# Patient Record
Sex: FEMALE | Race: WHITE | NOT HISPANIC OR LATINO | Employment: STUDENT | URBAN - METROPOLITAN AREA
[De-identification: names, ages, dates, MRNs, and addresses within clinical notes are randomized per-mention and may not be internally consistent; named-entity substitution may affect disease eponyms.]

---

## 2024-02-22 ENCOUNTER — TELEPHONE (OUTPATIENT)
Dept: OBSTETRICS AND GYNECOLOGY | Facility: CLINIC | Age: 29
End: 2024-02-22
Payer: COMMERCIAL

## 2024-02-22 NOTE — TELEPHONE ENCOUNTER
Spoke with pt, appt scheduled.     ----- Message from Sophia Horne sent at 2/22/2024 11:54 AM CST -----  Contact: Yuliya  Pt is calling in regards to est care/ confirm pregnancy. Pt can be reached at [phone].          Thanks  CHINO

## 2024-02-28 ENCOUNTER — OFFICE VISIT (OUTPATIENT)
Dept: OBSTETRICS AND GYNECOLOGY | Facility: CLINIC | Age: 29
End: 2024-02-28
Payer: COMMERCIAL

## 2024-02-28 ENCOUNTER — LAB VISIT (OUTPATIENT)
Dept: LAB | Facility: HOSPITAL | Age: 29
End: 2024-02-28
Attending: STUDENT IN AN ORGANIZED HEALTH CARE EDUCATION/TRAINING PROGRAM
Payer: COMMERCIAL

## 2024-02-28 VITALS
WEIGHT: 133.81 LBS | SYSTOLIC BLOOD PRESSURE: 108 MMHG | DIASTOLIC BLOOD PRESSURE: 60 MMHG | BODY MASS INDEX: 20.96 KG/M2

## 2024-02-28 DIAGNOSIS — O20.0 THREATENED MISCARRIAGE IN EARLY PREGNANCY: Primary | ICD-10-CM

## 2024-02-28 DIAGNOSIS — O20.0 THREATENED MISCARRIAGE IN EARLY PREGNANCY: ICD-10-CM

## 2024-02-28 LAB — HCG INTACT+B SERPL-ACNC: 7.5 MIU/ML

## 2024-02-28 PROCEDURE — 99204 OFFICE O/P NEW MOD 45 MIN: CPT | Mod: S$GLB,,,

## 2024-02-28 PROCEDURE — 1159F MED LIST DOCD IN RCRD: CPT | Mod: CPTII,S$GLB,,

## 2024-02-28 PROCEDURE — 36415 COLL VENOUS BLD VENIPUNCTURE: CPT

## 2024-02-28 PROCEDURE — 84702 CHORIONIC GONADOTROPIN TEST: CPT

## 2024-02-28 PROCEDURE — 3074F SYST BP LT 130 MM HG: CPT | Mod: CPTII,S$GLB,,

## 2024-02-28 PROCEDURE — 3008F BODY MASS INDEX DOCD: CPT | Mod: CPTII,S$GLB,,

## 2024-02-28 PROCEDURE — 99999 PR PBB SHADOW E&M-EST. PATIENT-LVL III: CPT | Mod: PBBFAC,,,

## 2024-02-28 PROCEDURE — 3078F DIAST BP <80 MM HG: CPT | Mod: CPTII,S$GLB,,

## 2024-02-28 RX ORDER — SERTRALINE HYDROCHLORIDE 50 MG/1
50 TABLET, FILM COATED ORAL DAILY
COMMUNITY

## 2024-02-28 NOTE — PROGRESS NOTES
Subjective:      Yuliya Flores is a 29 y.o. female. Yuliya reports bleeding and passage of clots since yesterday 24. She is not in acute distress. Ectopic risks: none.    Cycle length: has not had a cycle since before last pregnancy. Is still currently breastfeeding  Pregnancy testing: at home.  Pregnancy imaging:  V-scan utilized. GS noted with NO FP or YS .  Blood type: B positive..    The following portions of the patient's history were reviewed and updated as appropriate: allergies, current medications, past family history, past medical history, past social history, past surgical history, and problem list.    Review of Systems  Pertinent items are noted in HPI.     Objective:       /60   Wt 60.7 kg (133 lb 13.1 oz)   BMI 20.96 kg/m²   General:   alert, appears stated age, and cooperative                                         Imaging  Limited office ultrasound: not done on today's visit      Assessment:          Threatened      Plan:      Quantitative hCG today and again in 48 hours.  Warning signs discussed: to call for increased bleeding, abdominal or shoulder pain, light headedness, or if she has any concerns.

## 2024-03-01 ENCOUNTER — LAB VISIT (OUTPATIENT)
Dept: LAB | Facility: HOSPITAL | Age: 29
End: 2024-03-01
Payer: COMMERCIAL

## 2024-03-01 DIAGNOSIS — O20.0 THREATENED MISCARRIAGE IN EARLY PREGNANCY: ICD-10-CM

## 2024-03-01 LAB — HCG INTACT+B SERPL-ACNC: 2.7 MIU/ML

## 2024-03-01 PROCEDURE — 84702 CHORIONIC GONADOTROPIN TEST: CPT

## 2024-03-01 PROCEDURE — 36415 COLL VENOUS BLD VENIPUNCTURE: CPT

## 2024-07-09 ENCOUNTER — PATIENT MESSAGE (OUTPATIENT)
Dept: RESEARCH | Facility: HOSPITAL | Age: 29
End: 2024-07-09
Payer: COMMERCIAL

## 2024-08-08 ENCOUNTER — PATIENT MESSAGE (OUTPATIENT)
Dept: OBSTETRICS AND GYNECOLOGY | Facility: CLINIC | Age: 29
End: 2024-08-08
Payer: COMMERCIAL

## 2024-08-14 ENCOUNTER — LAB VISIT (OUTPATIENT)
Dept: LAB | Facility: HOSPITAL | Age: 29
End: 2024-08-14
Attending: ADVANCED PRACTICE MIDWIFE
Payer: COMMERCIAL

## 2024-08-14 ENCOUNTER — OFFICE VISIT (OUTPATIENT)
Dept: OBSTETRICS AND GYNECOLOGY | Facility: CLINIC | Age: 29
End: 2024-08-14
Payer: COMMERCIAL

## 2024-08-14 ENCOUNTER — PROCEDURE VISIT (OUTPATIENT)
Dept: OBSTETRICS AND GYNECOLOGY | Facility: CLINIC | Age: 29
End: 2024-08-14
Payer: COMMERCIAL

## 2024-08-14 VITALS — DIASTOLIC BLOOD PRESSURE: 64 MMHG | BODY MASS INDEX: 20.75 KG/M2 | WEIGHT: 132.5 LBS | SYSTOLIC BLOOD PRESSURE: 122 MMHG

## 2024-08-14 DIAGNOSIS — Z32.01 POSITIVE PREGNANCY TEST: Primary | ICD-10-CM

## 2024-08-14 DIAGNOSIS — Z32.01 POSITIVE PREGNANCY TEST: ICD-10-CM

## 2024-08-14 DIAGNOSIS — O99.341 ANXIETY IN PREGNANCY, ANTEPARTUM, FIRST TRIMESTER: ICD-10-CM

## 2024-08-14 DIAGNOSIS — F41.9 ANXIETY IN PREGNANCY, ANTEPARTUM, FIRST TRIMESTER: ICD-10-CM

## 2024-08-14 PROBLEM — F41.1 GAD (GENERALIZED ANXIETY DISORDER): Status: ACTIVE | Noted: 2024-01-22

## 2024-08-14 LAB
ABO + RH BLD: NORMAL
BASOPHILS # BLD AUTO: 0.03 K/UL (ref 0–0.2)
BASOPHILS NFR BLD: 0.6 % (ref 0–1.9)
BLD GP AB SCN CELLS X3 SERPL QL: NORMAL
DIFFERENTIAL METHOD BLD: NORMAL
EOSINOPHIL # BLD AUTO: 0 K/UL (ref 0–0.5)
EOSINOPHIL NFR BLD: 0.6 % (ref 0–8)
ERYTHROCYTE [DISTWIDTH] IN BLOOD BY AUTOMATED COUNT: 12.1 % (ref 11.5–14.5)
HAV IGM SERPL QL IA: NORMAL
HBV CORE IGM SERPL QL IA: NORMAL
HBV SURFACE AG SERPL QL IA: NORMAL
HCG INTACT+B SERPL-ACNC: 2312 MIU/ML
HCT VFR BLD AUTO: 37.6 % (ref 37–48.5)
HCV AB SERPL QL IA: NORMAL
HGB BLD-MCNC: 12.7 G/DL (ref 12–16)
HIV 1+2 AB+HIV1 P24 AG SERPL QL IA: NORMAL
IMM GRANULOCYTES # BLD AUTO: 0.01 K/UL (ref 0–0.04)
IMM GRANULOCYTES NFR BLD AUTO: 0.2 % (ref 0–0.5)
LYMPHOCYTES # BLD AUTO: 1.7 K/UL (ref 1–4.8)
LYMPHOCYTES NFR BLD: 34.6 % (ref 18–48)
MCH RBC QN AUTO: 30.3 PG (ref 27–31)
MCHC RBC AUTO-ENTMCNC: 33.8 G/DL (ref 32–36)
MCV RBC AUTO: 90 FL (ref 82–98)
MONOCYTES # BLD AUTO: 0.4 K/UL (ref 0.3–1)
MONOCYTES NFR BLD: 7.2 % (ref 4–15)
NEUTROPHILS # BLD AUTO: 2.8 K/UL (ref 1.8–7.7)
NEUTROPHILS NFR BLD: 56.8 % (ref 38–73)
NRBC BLD-RTO: 0 /100 WBC
PLATELET # BLD AUTO: 327 K/UL (ref 150–450)
PMV BLD AUTO: 10.5 FL (ref 9.2–12.9)
RBC # BLD AUTO: 4.19 M/UL (ref 4–5.4)
SPECIMEN OUTDATE: NORMAL
TREPONEMA PALLIDUM IGG+IGM AB [PRESENCE] IN SERUM OR PLASMA BY IMMUNOASSAY: NONREACTIVE
WBC # BLD AUTO: 4.85 K/UL (ref 3.9–12.7)

## 2024-08-14 PROCEDURE — 87147 CULTURE TYPE IMMUNOLOGIC: CPT | Performed by: ADVANCED PRACTICE MIDWIFE

## 2024-08-14 PROCEDURE — 86900 BLOOD TYPING SEROLOGIC ABO: CPT | Performed by: ADVANCED PRACTICE MIDWIFE

## 2024-08-14 PROCEDURE — 86762 RUBELLA ANTIBODY: CPT | Performed by: ADVANCED PRACTICE MIDWIFE

## 2024-08-14 PROCEDURE — 87088 URINE BACTERIA CULTURE: CPT | Performed by: ADVANCED PRACTICE MIDWIFE

## 2024-08-14 PROCEDURE — 99999 PR PBB SHADOW E&M-EST. PATIENT-LVL III: CPT | Mod: PBBFAC,,, | Performed by: ADVANCED PRACTICE MIDWIFE

## 2024-08-14 PROCEDURE — 86593 SYPHILIS TEST NON-TREP QUANT: CPT | Performed by: ADVANCED PRACTICE MIDWIFE

## 2024-08-14 PROCEDURE — 83021 HEMOGLOBIN CHROMOTOGRAPHY: CPT | Performed by: ADVANCED PRACTICE MIDWIFE

## 2024-08-14 PROCEDURE — 84702 CHORIONIC GONADOTROPIN TEST: CPT | Performed by: ADVANCED PRACTICE MIDWIFE

## 2024-08-14 PROCEDURE — 36415 COLL VENOUS BLD VENIPUNCTURE: CPT | Performed by: ADVANCED PRACTICE MIDWIFE

## 2024-08-14 PROCEDURE — 87086 URINE CULTURE/COLONY COUNT: CPT | Performed by: ADVANCED PRACTICE MIDWIFE

## 2024-08-14 PROCEDURE — 87591 N.GONORRHOEAE DNA AMP PROB: CPT | Performed by: ADVANCED PRACTICE MIDWIFE

## 2024-08-14 PROCEDURE — 87389 HIV-1 AG W/HIV-1&-2 AB AG IA: CPT | Performed by: ADVANCED PRACTICE MIDWIFE

## 2024-08-14 PROCEDURE — 85025 COMPLETE CBC W/AUTO DIFF WBC: CPT | Performed by: ADVANCED PRACTICE MIDWIFE

## 2024-08-14 PROCEDURE — 87491 CHLMYD TRACH DNA AMP PROBE: CPT | Performed by: ADVANCED PRACTICE MIDWIFE

## 2024-08-14 PROCEDURE — 86850 RBC ANTIBODY SCREEN: CPT | Performed by: ADVANCED PRACTICE MIDWIFE

## 2024-08-14 PROCEDURE — 80074 ACUTE HEPATITIS PANEL: CPT | Performed by: ADVANCED PRACTICE MIDWIFE

## 2024-08-14 RX ORDER — ASPIRIN 81 MG/1
81 TABLET ORAL DAILY
COMMUNITY

## 2024-08-14 RX ORDER — SERTRALINE HYDROCHLORIDE 50 MG/1
1 TABLET, FILM COATED ORAL EVERY MORNING
COMMUNITY
Start: 2023-09-27

## 2024-08-14 NOTE — PROGRESS NOTES
CHIEF COMPLAINT:   Patient presents with      Possible Pregnancy        HISTORY OF PRESENT ILLNESS  Yuliya Flores 29 y.o.  presents for pregnancy risk assessment.   The patient has no complaints today.  No nausea or vomiting. No bleeding or pain.  Pregnancy was planned and is desired.  Partner is supportive of pregnancy.  Lives at home with  and 3y/o son.  Denies domestic abuse.  Denies chemical/pesticide/radiation exposure.  OB history:      LMP: Patient's last menstrual period was 06/15/2024 (exact date).  EDC: Estimated Date of Delivery: 3/22/25  EGA: 8w4d       Health Maintenance   Topic Date Due    Hepatitis C Screening  Never done    Lipid Panel  Never done    Pap Smear  Never done    TETANUS VACCINE  2032       History reviewed. No pertinent past medical history.    History reviewed. No pertinent surgical history.    No family history on file.    Social History     Socioeconomic History    Marital status:    Tobacco Use    Smoking status: Never    Smokeless tobacco: Never   Substance and Sexual Activity    Alcohol use: No    Drug use: No    Sexual activity: Never       Current Outpatient Medications   Medication Sig Dispense Refill    sertraline (ZOLOFT) 50 MG tablet Take 1 tablet by mouth every morning.      aspirin (ECOTRIN) 81 MG EC tablet Take 81 mg by mouth once daily.      prenatal vit no.124/iron/folic (PRENATAL VITAMIN ORAL) Take 1 tablet by mouth once daily.       No current facility-administered medications for this visit.       Review of patient's allergies indicates:  No Known Allergies      PHYSICAL EXAM   Vitals:    24 1027   BP: 122/64        PAIN SCALE: 0/10 None    PHYSICAL EXAM    ROS:  GENERAL: No fever, chills, fatigability or weight loss.  CV: Denies chest pain  PULM: Denies shortness of breath or wheezing.  ABDOMEN: Appetite fine. No weight loss. Denies diarrhea, abdominal pain, hematemesis or blood in stool.  URINARY: No flank pain, dysuria or  hematuria.  REPRODUCTIVE: No abnormal vaginal bleeding.       PE:   APPEARANCE: Well nourished, well developed, in no acute distress  CHEST: Clear to auscultation bilaterally  CV: Regular rate and rhythm  ABDOMEN: Soft. No tenderness or masses. No hepatosplenomegaly. No hernias  PELVIC:   VULVA: No lesions. Normal female genitalia.  URETHRAL MEATUS: Normal size and location, no lesions, no prolapse.  URETHRA: No masses, tenderness, prolapse or scarring.  VAGINA: Moist and well rugated, no discharge, no significant cystocele or rectocele.  CERVIX: No lesions, normal diameter, no stenosis, no cervical motion tenderness.   Appears 4 weeks today    UPT +    A/P:     -      Patient was counseled today on A.C.S. Pap guidelines and recommendations for yearly pelvic exams, mammograms and monthly self breast exams; to see her PCP for other health maintenance and pregnancy.   -      Patient's medications and medical history reviewed with patient and implications in pregnancy.   -      Pregnancy course discussed and 'AtoZ' book given. Patient was counseled on proper weight gain based on the [employer]'s recommendations based on her pre-pregnancy weight. Discussed foods to avoid in pregnancy (i.e. sushi, fish that are high in mercury, deli meat, and unpasteurized cheeses). Discussed prenatal vitamin options (i.e. stool softener, DHA). Discussed potential medical problems in pregnancy.  -     Discussed risk of Toxoplasmosis transmission from pets and reviewed risk reduction techniques.  -     Pt was counseled on exercise in pregnancy and weight gain recommendations.  -     Oriented to practice including CNM collaboration.   -     Follow-up initial OB with u/s.   -     Pap smear and genprobe collected today  -     Labs today  -     Discussed last delivery, found out her father had CA during pregnancy and he passed away 3 weeks prior to her delivery, her blood pressure was slightly elevated towards the end of pregnancy  d/t this. She labored at the birth center and had prolonged second stage, was transferred to [facility] with Dr. Godinez and received an epidural and was told she had some protein in her urine, she was started on Mag therapy but was discontinued because her blood pressures were too low. Advised ASA 81mg daily   On zoloft 50mg for anxiety and stable at this time

## 2024-08-15 LAB
C TRACH DNA SPEC QL NAA+PROBE: NOT DETECTED
HGB A2 MFR BLD HPLC: 2.9 % (ref 2.2–3.2)
HGB FRACT BLD ELPH-IMP: NORMAL
HGB FRACT BLD ELPH-IMP: NORMAL
N GONORRHOEA DNA SPEC QL NAA+PROBE: NOT DETECTED
RUBV IGG SER-ACNC: 12.5 IU/ML
RUBV IGG SER-IMP: REACTIVE

## 2024-08-16 DIAGNOSIS — R82.71 GBS BACTERIURIA: Primary | ICD-10-CM

## 2024-08-16 LAB — BACTERIA UR CULT: ABNORMAL

## 2024-08-16 RX ORDER — AMOXICILLIN 500 MG/1
500 TABLET, FILM COATED ORAL EVERY 12 HOURS
Qty: 20 TABLET | Refills: 0 | Status: SHIPPED | OUTPATIENT
Start: 2024-08-16 | End: 2024-08-26

## 2024-08-17 ENCOUNTER — PATIENT MESSAGE (OUTPATIENT)
Dept: OBSTETRICS AND GYNECOLOGY | Facility: CLINIC | Age: 29
End: 2024-08-17
Payer: COMMERCIAL

## 2024-08-22 ENCOUNTER — PATIENT MESSAGE (OUTPATIENT)
Dept: OBSTETRICS AND GYNECOLOGY | Facility: CLINIC | Age: 29
End: 2024-08-22
Payer: COMMERCIAL

## 2024-08-30 ENCOUNTER — TELEPHONE (OUTPATIENT)
Dept: OBSTETRICS AND GYNECOLOGY | Facility: CLINIC | Age: 29
End: 2024-08-30
Payer: COMMERCIAL

## 2024-08-30 NOTE — TELEPHONE ENCOUNTER
Contacted patient, verified 2 patient identifiers, informed pt of results, and provider request for colpo. Patient accepted first available with Adriana Fierro MD at the [place] location. Patient declined consult appointment.

## 2024-08-30 NOTE — TELEPHONE ENCOUNTER
----- Message from Anil Andre CNM sent at 8/30/2024  7:37 AM CDT -----  Please call pt and let her know ASCUS with HPV +. She will need to get an appointment for a colposcopy. Thank you.  ----- Message -----  From: [facility]  Sent: 8/15/2024   9:22 PM CDT  To: Anil Andre CNM

## 2024-09-07 ENCOUNTER — PATIENT MESSAGE (OUTPATIENT)
Dept: OBSTETRICS AND GYNECOLOGY | Facility: CLINIC | Age: 29
End: 2024-09-07
Payer: COMMERCIAL

## 2024-09-10 ENCOUNTER — TELEPHONE (OUTPATIENT)
Dept: OBSTETRICS AND GYNECOLOGY | Facility: CLINIC | Age: 29
End: 2024-09-10
Payer: COMMERCIAL

## 2024-09-10 RX ORDER — DOXYLAMINE SUCCINATE AND PYRIDOXINE HYDROCHLORIDE 20; 20 MG/1; MG/1
1 TABLET, EXTENDED RELEASE ORAL 2 TIMES DAILY PRN
Qty: 60 TABLET | Refills: 1 | Status: SHIPPED | OUTPATIENT
Start: 2024-09-10

## 2024-09-10 NOTE — TELEPHONE ENCOUNTER
Pt called and wanted to reschedule her appointment on Thursday the 12th due to bad weather. Rescheduled pt to 9/19/24. Pt agreed to new appointment.

## 2024-09-19 ENCOUNTER — PATIENT MESSAGE (OUTPATIENT)
Dept: OBSTETRICS AND GYNECOLOGY | Facility: CLINIC | Age: 29
End: 2024-09-19
Payer: COMMERCIAL

## 2024-09-25 ENCOUNTER — PROCEDURE VISIT (OUTPATIENT)
Dept: OBSTETRICS AND GYNECOLOGY | Facility: CLINIC | Age: 29
End: 2024-09-25
Payer: COMMERCIAL

## 2024-09-25 ENCOUNTER — PATIENT MESSAGE (OUTPATIENT)
Dept: ADMINISTRATIVE | Facility: OTHER | Age: 29
End: 2024-09-25
Payer: COMMERCIAL

## 2024-09-25 ENCOUNTER — PATIENT MESSAGE (OUTPATIENT)
Dept: OBSTETRICS AND GYNECOLOGY | Facility: CLINIC | Age: 29
End: 2024-09-25

## 2024-09-25 ENCOUNTER — INITIAL PRENATAL (OUTPATIENT)
Dept: OBSTETRICS AND GYNECOLOGY | Facility: CLINIC | Age: 29
End: 2024-09-25
Payer: COMMERCIAL

## 2024-09-25 VITALS
WEIGHT: 131.63 LBS | SYSTOLIC BLOOD PRESSURE: 120 MMHG | DIASTOLIC BLOOD PRESSURE: 76 MMHG | BODY MASS INDEX: 20.61 KG/M2

## 2024-09-25 DIAGNOSIS — F41.9 ANXIETY IN PREGNANCY, ANTEPARTUM, FIRST TRIMESTER: ICD-10-CM

## 2024-09-25 DIAGNOSIS — O99.341 ANXIETY IN PREGNANCY, ANTEPARTUM, FIRST TRIMESTER: ICD-10-CM

## 2024-09-25 DIAGNOSIS — Z32.01 POSITIVE PREGNANCY TEST: ICD-10-CM

## 2024-09-25 DIAGNOSIS — R87.810 ASCUS WITH POSITIVE HIGH RISK HPV CERVICAL: ICD-10-CM

## 2024-09-25 DIAGNOSIS — Z3A.11 11 WEEKS GESTATION OF PREGNANCY: Primary | ICD-10-CM

## 2024-09-25 DIAGNOSIS — R87.610 ASCUS WITH POSITIVE HIGH RISK HPV CERVICAL: ICD-10-CM

## 2024-09-25 DIAGNOSIS — R82.71 GBS BACTERIURIA: ICD-10-CM

## 2024-09-25 PROCEDURE — 99999 PR PBB SHADOW E&M-EST. PATIENT-LVL III: CPT | Mod: PBBFAC,,, | Performed by: MIDWIFE

## 2024-09-25 PROCEDURE — 87088 URINE BACTERIA CULTURE: CPT | Performed by: MIDWIFE

## 2024-09-25 PROCEDURE — 0502F SUBSEQUENT PRENATAL CARE: CPT | Mod: CPTII,S$GLB,, | Performed by: MIDWIFE

## 2024-09-25 PROCEDURE — 87147 CULTURE TYPE IMMUNOLOGIC: CPT | Performed by: MIDWIFE

## 2024-09-25 PROCEDURE — 76801 OB US < 14 WKS SINGLE FETUS: CPT | Mod: S$GLB,,, | Performed by: OBSTETRICS & GYNECOLOGY

## 2024-09-25 PROCEDURE — 87086 URINE CULTURE/COLONY COUNT: CPT | Performed by: MIDWIFE

## 2024-09-25 NOTE — PROGRESS NOTES
11 weeks gestation of pregnancy  -     Connected MOM Enrollment  -     Assign Connected MOM Program Consent Questionnaire    Patient here for Initial OB visit and dating scan  U/S: single viable IUP. NELSON based on CRL: 4/13/25  Denies pain or vaginal bleeding. Nausea is manageable.   Reviewed Risk Assessment note and New OB labs.  Discussed genetic screening in pregnancy. Mat21 info given, encouraged to check coverage with insurance company if desires.  Offered flu shot, declines   Taking daily baby ASA   Connected MOM order placed and instructions given  A to Z book given. Encouraged to reference as needed.   Bleeding and pain precautions reviewed  RTC in 4 weeks, sooner if needed     GBS bacteriuria  -     CULTURE, URINE    JE today  Discussed treatment in labor    Anxiety in pregnancy, antepartum, first trimester    Zoloft    ASCUS with positive high risk HPV cervical     Colpo PP per Dr. Fierro -- questions answered

## 2024-09-27 LAB — BACTERIA UR CULT: ABNORMAL

## 2024-10-04 ENCOUNTER — PATIENT MESSAGE (OUTPATIENT)
Dept: OBSTETRICS AND GYNECOLOGY | Facility: CLINIC | Age: 29
End: 2024-10-04
Payer: COMMERCIAL

## 2024-10-22 ENCOUNTER — ROUTINE PRENATAL (OUTPATIENT)
Dept: OBSTETRICS AND GYNECOLOGY | Facility: CLINIC | Age: 29
End: 2024-10-22
Payer: COMMERCIAL

## 2024-10-22 VITALS — BODY MASS INDEX: 21.3 KG/M2 | WEIGHT: 136 LBS | DIASTOLIC BLOOD PRESSURE: 70 MMHG | SYSTOLIC BLOOD PRESSURE: 119 MMHG

## 2024-10-22 DIAGNOSIS — R11.0 NAUSEA: ICD-10-CM

## 2024-10-22 DIAGNOSIS — Z3A.15 15 WEEKS GESTATION OF PREGNANCY: Primary | ICD-10-CM

## 2024-10-22 DIAGNOSIS — O99.342 ANXIETY DURING PREGNANCY IN SECOND TRIMESTER, ANTEPARTUM: ICD-10-CM

## 2024-10-22 DIAGNOSIS — F41.9 ANXIETY DURING PREGNANCY IN SECOND TRIMESTER, ANTEPARTUM: ICD-10-CM

## 2024-10-22 DIAGNOSIS — R87.810 ASCUS WITH POSITIVE HIGH RISK HPV CERVICAL: ICD-10-CM

## 2024-10-22 DIAGNOSIS — R82.71 GBS BACTERIURIA: ICD-10-CM

## 2024-10-22 DIAGNOSIS — R87.610 ASCUS WITH POSITIVE HIGH RISK HPV CERVICAL: ICD-10-CM

## 2024-10-22 DIAGNOSIS — Z36.89 ENCOUNTER FOR FETAL ANATOMIC SURVEY: ICD-10-CM

## 2024-10-22 PROBLEM — Z34.92 NORMAL PREGNANCY IN SECOND TRIMESTER: Status: ACTIVE | Noted: 2024-10-22

## 2024-10-22 PROCEDURE — 0502F SUBSEQUENT PRENATAL CARE: CPT | Mod: CPTII,S$GLB,, | Performed by: MIDWIFE

## 2024-10-22 PROCEDURE — 99999 PR PBB SHADOW E&M-EST. PATIENT-LVL III: CPT | Mod: PBBFAC,,, | Performed by: MIDWIFE

## 2024-10-22 RX ORDER — ONDANSETRON 4 MG/1
4 TABLET, ORALLY DISINTEGRATING ORAL EVERY 6 HOURS PRN
Qty: 25 TABLET | Refills: 1 | Status: SHIPPED | OUTPATIENT
Start: 2024-10-22

## 2024-10-22 NOTE — PROGRESS NOTES
15 weeks gestation of pregnancy    Overall doing well, starting to feel better and get energy back, going to gym  + flutters  Denies pain, VB  [employer] insurance companies, declines for now  Plans to use Dr. Carranza at  Clinic for pedi, packet given  6lb 0.4oz TWG  Bleeding and pain precautions  RTC in 4 weeks with anatomy scan, sooner if needed     Encounter for fetal anatomic survey  -     US OB/GYN Procedure (Viewpoint)-Future; Future    ASCUS with positive high risk HPV cervical  Colpo PP    GBS bacteriuria  Treat in labor    Anxiety during pregnancy in second trimester, antepartum  Zoloft 50mg daily    Nausea  -     ondansetron (ZOFRAN-ODT) 4 MG TbDL; Take 1 tablet (4 mg total) by mouth every 6 (six) hours as needed (nausea).  Dispense: 25 tablet; Refill: 1   Lingering nausea. Rx Zofran. Discussed may cause constipation.

## 2024-10-27 ENCOUNTER — PATIENT MESSAGE (OUTPATIENT)
Dept: OTHER | Facility: OTHER | Age: 29
End: 2024-10-27
Payer: COMMERCIAL

## 2024-11-03 ENCOUNTER — PATIENT MESSAGE (OUTPATIENT)
Dept: OTHER | Facility: OTHER | Age: 29
End: 2024-11-03
Payer: COMMERCIAL

## 2024-11-18 ENCOUNTER — TELEPHONE (OUTPATIENT)
Dept: OBSTETRICS AND GYNECOLOGY | Facility: CLINIC | Age: 29
End: 2024-11-18
Payer: COMMERCIAL

## 2024-11-18 NOTE — TELEPHONE ENCOUNTER
Contacted pt to confirm appt on 11/19/24 with Tessa Wu CNM.     No answer, left voicemail to return call to clinic if appt needs to be rescheduled.

## 2024-11-19 ENCOUNTER — PROCEDURE VISIT (OUTPATIENT)
Dept: OBSTETRICS AND GYNECOLOGY | Facility: CLINIC | Age: 29
End: 2024-11-19
Payer: COMMERCIAL

## 2024-11-19 ENCOUNTER — ROUTINE PRENATAL (OUTPATIENT)
Dept: OBSTETRICS AND GYNECOLOGY | Facility: CLINIC | Age: 29
End: 2024-11-19
Payer: COMMERCIAL

## 2024-11-19 VITALS
DIASTOLIC BLOOD PRESSURE: 70 MMHG | WEIGHT: 142.44 LBS | SYSTOLIC BLOOD PRESSURE: 118 MMHG | BODY MASS INDEX: 22.31 KG/M2

## 2024-11-19 DIAGNOSIS — Z3A.19 19 WEEKS GESTATION OF PREGNANCY: Primary | ICD-10-CM

## 2024-11-19 DIAGNOSIS — Z36.89 ENCOUNTER FOR FETAL ANATOMIC SURVEY: ICD-10-CM

## 2024-11-19 DIAGNOSIS — Z36.2 ENCOUNTER FOR FOLLOW-UP ULTRASOUND OF FETAL ANATOMY: ICD-10-CM

## 2024-11-19 DIAGNOSIS — R82.71 GBS BACTERIURIA: ICD-10-CM

## 2024-11-19 DIAGNOSIS — F41.9 ANXIETY IN PREGNANCY, ANTEPARTUM, FIRST TRIMESTER: ICD-10-CM

## 2024-11-19 DIAGNOSIS — R87.610 ASCUS WITH POSITIVE HIGH RISK HPV CERVICAL: ICD-10-CM

## 2024-11-19 DIAGNOSIS — R87.810 ASCUS WITH POSITIVE HIGH RISK HPV CERVICAL: ICD-10-CM

## 2024-11-19 DIAGNOSIS — O99.341 ANXIETY IN PREGNANCY, ANTEPARTUM, FIRST TRIMESTER: ICD-10-CM

## 2024-11-19 PROCEDURE — 76811 OB US DETAILED SNGL FETUS: CPT | Mod: S$GLB,,, | Performed by: OBSTETRICS & GYNECOLOGY

## 2024-11-19 PROCEDURE — 99999 PR PBB SHADOW E&M-EST. PATIENT-LVL III: CPT | Mod: PBBFAC,,, | Performed by: MIDWIFE

## 2024-11-19 PROCEDURE — 0502F SUBSEQUENT PRENATAL CARE: CPT | Mod: CPTII,S$GLB,, | Performed by: MIDWIFE

## 2024-11-19 NOTE — PROGRESS NOTES
19 weeks gestation of pregnancy    Doing well, + fetal movement  Denies contractions, VB, LOF  Discussed birth plan. Would like to wait for spontaneous labor. Desires an epidural.   Plans to breastfeed. Successfully BF son for 2 years, no issues.   Anatomy scan today: breech, anterior placenta, R/L choroid plexus cystic appearance. Otherwise normal anatomy with sub op views. MFM to review.  12lb 6.7oz TWG  PTL precautions  RTC in 4 weeks with f/u U/S, sooner if needed     Encounter for follow-up ultrasound of fetal anatomy  -     US OB/GYN Procedure (Viewpoint)-Future; Future    Anxiety in pregnancy    Zoloft    GBS bacteriuria    Treat in labor    ASCUS with positive high risk HPV cervical     Colpo PP

## 2024-11-20 ENCOUNTER — PATIENT MESSAGE (OUTPATIENT)
Dept: OBSTETRICS AND GYNECOLOGY | Facility: CLINIC | Age: 29
End: 2024-11-20
Payer: COMMERCIAL

## 2024-11-24 ENCOUNTER — PATIENT MESSAGE (OUTPATIENT)
Dept: OTHER | Facility: OTHER | Age: 29
End: 2024-11-24
Payer: COMMERCIAL

## 2024-12-04 DIAGNOSIS — R11.0 NAUSEA: ICD-10-CM

## 2024-12-04 RX ORDER — ONDANSETRON 4 MG/1
4 TABLET, ORALLY DISINTEGRATING ORAL EVERY 6 HOURS PRN
Qty: 25 TABLET | Refills: 1 | Status: SHIPPED | OUTPATIENT
Start: 2024-12-04

## 2024-12-10 ENCOUNTER — ROUTINE PRENATAL (OUTPATIENT)
Dept: OBSTETRICS AND GYNECOLOGY | Facility: CLINIC | Age: 29
End: 2024-12-10
Payer: COMMERCIAL

## 2024-12-10 ENCOUNTER — PROCEDURE VISIT (OUTPATIENT)
Dept: OBSTETRICS AND GYNECOLOGY | Facility: CLINIC | Age: 29
End: 2024-12-10
Payer: COMMERCIAL

## 2024-12-10 VITALS
DIASTOLIC BLOOD PRESSURE: 60 MMHG | WEIGHT: 147.06 LBS | BODY MASS INDEX: 23.03 KG/M2 | SYSTOLIC BLOOD PRESSURE: 110 MMHG

## 2024-12-10 DIAGNOSIS — F41.9 ANXIETY IN PREGNANCY, ANTEPARTUM, FIRST TRIMESTER: ICD-10-CM

## 2024-12-10 DIAGNOSIS — Z36.2 ENCOUNTER FOR FOLLOW-UP ULTRASOUND OF FETAL ANATOMY: ICD-10-CM

## 2024-12-10 DIAGNOSIS — R87.810 ASCUS WITH POSITIVE HIGH RISK HPV CERVICAL: ICD-10-CM

## 2024-12-10 DIAGNOSIS — R87.610 ASCUS WITH POSITIVE HIGH RISK HPV CERVICAL: ICD-10-CM

## 2024-12-10 DIAGNOSIS — Z3A.22 22 WEEKS GESTATION OF PREGNANCY: Primary | ICD-10-CM

## 2024-12-10 DIAGNOSIS — R09.81 NASAL CONGESTION: ICD-10-CM

## 2024-12-10 DIAGNOSIS — R82.71 GBS BACTERIURIA: ICD-10-CM

## 2024-12-10 DIAGNOSIS — O99.341 ANXIETY IN PREGNANCY, ANTEPARTUM, FIRST TRIMESTER: ICD-10-CM

## 2024-12-10 PROCEDURE — 76816 OB US FOLLOW-UP PER FETUS: CPT | Mod: S$GLB,,, | Performed by: OBSTETRICS & GYNECOLOGY

## 2024-12-10 PROCEDURE — 99999 PR PBB SHADOW E&M-EST. PATIENT-LVL III: CPT | Mod: PBBFAC,,, | Performed by: MIDWIFE

## 2024-12-10 PROCEDURE — 0502F SUBSEQUENT PRENATAL CARE: CPT | Mod: CPTII,S$GLB,, | Performed by: MIDWIFE

## 2024-12-10 RX ORDER — FLUTICASONE PROPIONATE 50 MCG
1 SPRAY, SUSPENSION (ML) NASAL DAILY
Qty: 16 G | Refills: 1 | Status: SHIPPED | OUTPATIENT
Start: 2024-12-10

## 2024-12-10 NOTE — PROGRESS NOTES
22 weeks gestation of pregnancy    Overall doing well, + fetal movement  Denies CTX, VB, LOF  Discussed safe remedies for nasal congestion, Rx Flonase  Sees chiro for tailbone pain, discussed PT if worsens  Declines Zoom prenatal visits  U/S today: VTX, anterior placenta, growth 47%tile, normal anatomy, MFM to review.   17lb 0.8oz TWG  PTL precautions  RTC in 4 weeks, sooner if needed     Anxiety in pregnancy    Zoloft    GBS bacteriuria    Treat in labor    ASCUS with positive high risk HPV cervical    Colpo PP    Nasal congestion  -     fluticasone propionate (FLONASE) 50 mcg/actuation nasal spray; 1 spray (50 mcg total) by Each Nostril route once daily.  Dispense: 16 g; Refill: 1

## 2024-12-12 ENCOUNTER — PATIENT MESSAGE (OUTPATIENT)
Dept: OBSTETRICS AND GYNECOLOGY | Facility: CLINIC | Age: 29
End: 2024-12-12
Payer: COMMERCIAL

## 2024-12-16 DIAGNOSIS — R11.2 NAUSEA AND VOMITING, UNSPECIFIED VOMITING TYPE: Primary | ICD-10-CM

## 2024-12-16 RX ORDER — PROMETHAZINE HYDROCHLORIDE 25 MG/1
25 TABLET ORAL EVERY 6 HOURS PRN
Qty: 25 TABLET | Refills: 1 | Status: SHIPPED | OUTPATIENT
Start: 2024-12-16

## 2024-12-22 ENCOUNTER — PATIENT MESSAGE (OUTPATIENT)
Dept: OTHER | Facility: OTHER | Age: 29
End: 2024-12-22
Payer: COMMERCIAL

## 2024-12-23 ENCOUNTER — PATIENT MESSAGE (OUTPATIENT)
Dept: OBSTETRICS AND GYNECOLOGY | Facility: CLINIC | Age: 29
End: 2024-12-23
Payer: COMMERCIAL

## 2024-12-25 PROBLEM — Z87.59 HISTORY OF POSTPARTUM PRE-ECLAMPSIA: Status: ACTIVE | Noted: 2024-12-25

## 2024-12-25 PROBLEM — Z86.79 HISTORY OF POSTPARTUM PRE-ECLAMPSIA: Status: ACTIVE | Noted: 2024-12-25

## 2024-12-26 ENCOUNTER — LAB VISIT (OUTPATIENT)
Dept: LAB | Facility: HOSPITAL | Age: 29
End: 2024-12-26
Attending: ADVANCED PRACTICE MIDWIFE
Payer: COMMERCIAL

## 2024-12-26 ENCOUNTER — ROUTINE PRENATAL (OUTPATIENT)
Dept: OBSTETRICS AND GYNECOLOGY | Facility: CLINIC | Age: 29
End: 2024-12-26
Payer: COMMERCIAL

## 2024-12-26 VITALS
WEIGHT: 149.69 LBS | DIASTOLIC BLOOD PRESSURE: 64 MMHG | BODY MASS INDEX: 23.45 KG/M2 | SYSTOLIC BLOOD PRESSURE: 108 MMHG

## 2024-12-26 DIAGNOSIS — Z34.92 NORMAL PREGNANCY IN SECOND TRIMESTER: Primary | ICD-10-CM

## 2024-12-26 DIAGNOSIS — Z34.92 NORMAL PREGNANCY IN SECOND TRIMESTER: ICD-10-CM

## 2024-12-26 LAB
ALBUMIN SERPL BCP-MCNC: 3.2 G/DL (ref 3.5–5.2)
ALP SERPL-CCNC: 71 U/L (ref 40–150)
ALT SERPL W/O P-5'-P-CCNC: 7 U/L (ref 10–44)
ANION GAP SERPL CALC-SCNC: 8 MMOL/L (ref 8–16)
AST SERPL-CCNC: 14 U/L (ref 10–40)
BILIRUB SERPL-MCNC: 0.3 MG/DL (ref 0.1–1)
BUN SERPL-MCNC: 9 MG/DL (ref 6–20)
CALCIUM SERPL-MCNC: 9.3 MG/DL (ref 8.7–10.5)
CHLORIDE SERPL-SCNC: 105 MMOL/L (ref 95–110)
CO2 SERPL-SCNC: 22 MMOL/L (ref 23–29)
CREAT SERPL-MCNC: 0.6 MG/DL (ref 0.5–1.4)
CREAT UR-MCNC: 43 MG/DL (ref 15–325)
ERYTHROCYTE [DISTWIDTH] IN BLOOD BY AUTOMATED COUNT: 12.6 % (ref 11.5–14.5)
EST. GFR  (NO RACE VARIABLE): >60 ML/MIN/1.73 M^2
GLUCOSE SERPL-MCNC: 88 MG/DL (ref 70–110)
HCT VFR BLD AUTO: 32.3 % (ref 37–48.5)
HGB BLD-MCNC: 10.7 G/DL (ref 12–16)
MCH RBC QN AUTO: 30.7 PG (ref 27–31)
MCHC RBC AUTO-ENTMCNC: 33.1 G/DL (ref 32–36)
MCV RBC AUTO: 93 FL (ref 82–98)
PLATELET # BLD AUTO: 326 K/UL (ref 150–450)
PMV BLD AUTO: 10.6 FL (ref 9.2–12.9)
POTASSIUM SERPL-SCNC: 3.8 MMOL/L (ref 3.5–5.1)
PROT SERPL-MCNC: 6.9 G/DL (ref 6–8.4)
PROT UR-MCNC: <7 MG/DL (ref 0–15)
PROT/CREAT UR: NORMAL MG/G{CREAT} (ref 0–0.2)
RBC # BLD AUTO: 3.48 M/UL (ref 4–5.4)
SODIUM SERPL-SCNC: 135 MMOL/L (ref 136–145)
WBC # BLD AUTO: 10.21 K/UL (ref 3.9–12.7)

## 2024-12-26 PROCEDURE — 0502F SUBSEQUENT PRENATAL CARE: CPT | Mod: CPTII,S$GLB,, | Performed by: ADVANCED PRACTICE MIDWIFE

## 2024-12-26 PROCEDURE — 36415 COLL VENOUS BLD VENIPUNCTURE: CPT | Performed by: ADVANCED PRACTICE MIDWIFE

## 2024-12-26 PROCEDURE — 80053 COMPREHEN METABOLIC PANEL: CPT | Performed by: ADVANCED PRACTICE MIDWIFE

## 2024-12-26 PROCEDURE — 99999 PR PBB SHADOW E&M-EST. PATIENT-LVL III: CPT | Mod: PBBFAC,,, | Performed by: ADVANCED PRACTICE MIDWIFE

## 2024-12-26 PROCEDURE — 82570 ASSAY OF URINE CREATININE: CPT | Performed by: ADVANCED PRACTICE MIDWIFE

## 2024-12-26 PROCEDURE — 85027 COMPLETE CBC AUTOMATED: CPT | Performed by: ADVANCED PRACTICE MIDWIFE

## 2024-12-26 NOTE — PROGRESS NOTES
29 y.o. female  at 24w4d   Reports + FM, denies VB, LOF, or cramping  Doing well, has had some high BP readings at home and constant headaches for the past week.   Encouraged magnesium supplement. Reviewed s/s of pre-e and stressed when to go to hospital. Will do baseline work up today. Had PP pre-e with last baby.   Bilateral patellar reflexes +2, no edema.       TW lbs   Discussed feeding options: breast  Encouraged patient to attend [facility]s Prenatal Breastfeeding Class.    Reviewed upcoming 28wk labs, (B POS) and orders placed  Tdap handout provided and explained    1. Normal pregnancy in second trimester  -     CBC Without Differential; Future; Expected date: 2024  -     HIV 1/2 Ag/Ab (4th Gen); Future; Expected date: 2024  -     OB Glucose Screen; Future; Expected date: 2024  -     Treponema Pallidium Antibodies IgG, IgM; Future; Expected date: 2024  -     COMPREHENSIVE METABOLIC PANEL; Future; Expected date: 2024  -     Protein/Creatinine Ratio, Urine  -     CBC Without Differential; Future; Expected date: 2024         Reviewed warning signs, normal FM,  labor precautions and how/when to call.  RTC x 4 wks, call or present sooner prn.

## 2024-12-27 ENCOUNTER — PATIENT MESSAGE (OUTPATIENT)
Dept: OBSTETRICS AND GYNECOLOGY | Facility: CLINIC | Age: 29
End: 2024-12-27
Payer: COMMERCIAL

## 2025-01-05 ENCOUNTER — PATIENT MESSAGE (OUTPATIENT)
Dept: OTHER | Facility: OTHER | Age: 30
End: 2025-01-05
Payer: COMMERCIAL

## 2025-01-10 DIAGNOSIS — R11.0 NAUSEA: ICD-10-CM

## 2025-01-13 RX ORDER — ONDANSETRON 4 MG/1
4 TABLET, ORALLY DISINTEGRATING ORAL EVERY 6 HOURS PRN
Qty: 25 TABLET | Refills: 1 | Status: SHIPPED | OUTPATIENT
Start: 2025-01-13

## 2025-01-19 ENCOUNTER — PATIENT MESSAGE (OUTPATIENT)
Dept: OTHER | Facility: OTHER | Age: 30
End: 2025-01-19
Payer: COMMERCIAL

## 2025-01-26 ENCOUNTER — PATIENT MESSAGE (OUTPATIENT)
Dept: OBSTETRICS AND GYNECOLOGY | Facility: CLINIC | Age: 30
End: 2025-01-26
Payer: COMMERCIAL

## 2025-01-29 ENCOUNTER — LAB VISIT (OUTPATIENT)
Dept: LAB | Facility: HOSPITAL | Age: 30
End: 2025-01-29
Attending: MIDWIFE
Payer: COMMERCIAL

## 2025-01-29 ENCOUNTER — ROUTINE PRENATAL (OUTPATIENT)
Dept: OBSTETRICS AND GYNECOLOGY | Facility: CLINIC | Age: 30
End: 2025-01-29
Payer: COMMERCIAL

## 2025-01-29 VITALS
SYSTOLIC BLOOD PRESSURE: 110 MMHG | BODY MASS INDEX: 24.48 KG/M2 | WEIGHT: 156.31 LBS | DIASTOLIC BLOOD PRESSURE: 60 MMHG

## 2025-01-29 DIAGNOSIS — O99.341 ANXIETY IN PREGNANCY, ANTEPARTUM, FIRST TRIMESTER: ICD-10-CM

## 2025-01-29 DIAGNOSIS — R87.810 ASCUS WITH POSITIVE HIGH RISK HPV CERVICAL: ICD-10-CM

## 2025-01-29 DIAGNOSIS — Z34.92 NORMAL PREGNANCY IN SECOND TRIMESTER: ICD-10-CM

## 2025-01-29 DIAGNOSIS — F41.9 ANXIETY IN PREGNANCY, ANTEPARTUM, FIRST TRIMESTER: ICD-10-CM

## 2025-01-29 DIAGNOSIS — Z86.79 HISTORY OF POSTPARTUM PRE-ECLAMPSIA: ICD-10-CM

## 2025-01-29 DIAGNOSIS — R87.610 ASCUS WITH POSITIVE HIGH RISK HPV CERVICAL: ICD-10-CM

## 2025-01-29 DIAGNOSIS — Z3A.29 29 WEEKS GESTATION OF PREGNANCY: Primary | ICD-10-CM

## 2025-01-29 DIAGNOSIS — Z87.59 HISTORY OF POSTPARTUM PRE-ECLAMPSIA: ICD-10-CM

## 2025-01-29 DIAGNOSIS — R82.71 GBS BACTERIURIA: ICD-10-CM

## 2025-01-29 LAB
ERYTHROCYTE [DISTWIDTH] IN BLOOD BY AUTOMATED COUNT: 12.8 % (ref 11.5–14.5)
HCT VFR BLD AUTO: 32.2 % (ref 37–48.5)
HGB BLD-MCNC: 10.8 G/DL (ref 12–16)
MCH RBC QN AUTO: 31 PG (ref 27–31)
MCHC RBC AUTO-ENTMCNC: 33.5 G/DL (ref 32–36)
MCV RBC AUTO: 93 FL (ref 82–98)
PLATELET # BLD AUTO: 277 K/UL (ref 150–450)
PMV BLD AUTO: 10.8 FL (ref 9.2–12.9)
RBC # BLD AUTO: 3.48 M/UL (ref 4–5.4)
WBC # BLD AUTO: 10.54 K/UL (ref 3.9–12.7)

## 2025-01-29 PROCEDURE — 0502F SUBSEQUENT PRENATAL CARE: CPT | Mod: CPTII,S$GLB,, | Performed by: MIDWIFE

## 2025-01-29 PROCEDURE — 86593 SYPHILIS TEST NON-TREP QUANT: CPT | Performed by: ADVANCED PRACTICE MIDWIFE

## 2025-01-29 PROCEDURE — 90471 IMMUNIZATION ADMIN: CPT | Mod: S$GLB,,, | Performed by: OBSTETRICS & GYNECOLOGY

## 2025-01-29 PROCEDURE — 82950 GLUCOSE TEST: CPT | Performed by: ADVANCED PRACTICE MIDWIFE

## 2025-01-29 PROCEDURE — 85027 COMPLETE CBC AUTOMATED: CPT | Performed by: ADVANCED PRACTICE MIDWIFE

## 2025-01-29 PROCEDURE — 36415 COLL VENOUS BLD VENIPUNCTURE: CPT | Performed by: ADVANCED PRACTICE MIDWIFE

## 2025-01-29 PROCEDURE — 87389 HIV-1 AG W/HIV-1&-2 AB AG IA: CPT | Performed by: ADVANCED PRACTICE MIDWIFE

## 2025-01-29 PROCEDURE — 90715 TDAP VACCINE 7 YRS/> IM: CPT | Mod: S$GLB,,, | Performed by: OBSTETRICS & GYNECOLOGY

## 2025-01-29 PROCEDURE — 99999 PR PBB SHADOW E&M-EST. PATIENT-LVL III: CPT | Mod: PBBFAC,,, | Performed by: MIDWIFE

## 2025-01-29 NOTE — PROGRESS NOTES
29 weeks gestation of pregnancy  -     HIV 1/2 Ag/Ab (4th Gen); Future; Expected date: 01/29/2025  -     Tdap (BOOSTRIX) vaccine injection 0.5 mL    Doing well, no complaints  Good fetal movement  Denies contractions, VB, LOF. Mild BH.   3T labs in progress  TDAP today  26lb 4.9oz TWG, S=D  Kick counts and PTL precautions  RTC in 2 weeks, sooner if needed     Anxiety in pregnancy, antepartum    Zoloft    GBS bacteriuria    Treat in labor    ASCUS with positive high risk HPV cervical    Colpo PP    History of postpartum pre-eclampsia     Daily baby ASA

## 2025-01-29 NOTE — PROGRESS NOTES
After obtaining consent, and per orders of Tessa Wu CNM , injection of  Tdap (BOOSTRIX) given by Isis Dela Cruz. Patient instructed to remain in clinic for 15 minutes afterwards, and to report any adverse reaction to me immediately.    Injection given via Left deltoid.

## 2025-01-29 NOTE — PROGRESS NOTES
29 weeks gestation of pregnancy  -     HIV 1/2 Ag/Ab (4th Gen); Future; Expected date: 01/29/2025  -     Tdap (BOOSTRIX) vaccine injection 0.5 mL    Anxiety in pregnancy, antepartum, first trimester    GBS bacteriuria    ASCUS with positive high risk HPV cervical    History of postpartum pre-eclampsia

## 2025-01-30 LAB
GLUCOSE SERPL-MCNC: 131 MG/DL (ref 70–140)
HIV 1+2 AB+HIV1 P24 AG SERPL QL IA: NORMAL
TREPONEMA PALLIDUM IGG+IGM AB [PRESENCE] IN SERUM OR PLASMA BY IMMUNOASSAY: NONREACTIVE

## 2025-02-12 ENCOUNTER — ROUTINE PRENATAL (OUTPATIENT)
Dept: OBSTETRICS AND GYNECOLOGY | Facility: CLINIC | Age: 30
End: 2025-02-12
Payer: COMMERCIAL

## 2025-02-12 VITALS
WEIGHT: 158.31 LBS | DIASTOLIC BLOOD PRESSURE: 64 MMHG | SYSTOLIC BLOOD PRESSURE: 117 MMHG | BODY MASS INDEX: 24.79 KG/M2

## 2025-02-12 DIAGNOSIS — Z36.89 ENCOUNTER FOR ULTRASOUND TO ASSESS FETAL GROWTH: ICD-10-CM

## 2025-02-12 DIAGNOSIS — F41.9 ANXIETY IN PREGNANCY, ANTEPARTUM, FIRST TRIMESTER: ICD-10-CM

## 2025-02-12 DIAGNOSIS — R82.71 GBS BACTERIURIA: ICD-10-CM

## 2025-02-12 DIAGNOSIS — O99.341 ANXIETY IN PREGNANCY, ANTEPARTUM, FIRST TRIMESTER: ICD-10-CM

## 2025-02-12 DIAGNOSIS — Z3A.31 31 WEEKS GESTATION OF PREGNANCY: Primary | ICD-10-CM

## 2025-02-12 PROBLEM — Z34.93 NORMAL PREGNANCY IN THIRD TRIMESTER: Status: ACTIVE | Noted: 2024-10-22

## 2025-02-12 PROCEDURE — 99999 PR PBB SHADOW E&M-EST. PATIENT-LVL III: CPT | Mod: PBBFAC,,, | Performed by: MIDWIFE

## 2025-02-12 PROCEDURE — 0502F SUBSEQUENT PRENATAL CARE: CPT | Mod: CPTII,S$GLB,, | Performed by: MIDWIFE

## 2025-02-12 NOTE — PROGRESS NOTES
31 weeks gestation of pregnancy    Doing well, no complaints  Good fetal movement  Denies CTX, VB, LOF  Contraception: discussed options. Is considering vasectomy vs Micronor.   [facility] for pedi, packet given  28lb 4.6oz TWG, S=D  Kick counts and PTL precautions  RTC in 2 weeks, sooner if needed     Anxiety in pregnancy, antepartum    Zoloft    GBS bacteriuria    Treat in labor    Encounter for ultrasound to assess fetal growth  -     US OB/GYN Procedure (Viewpoint)-Future; Future

## 2025-02-16 ENCOUNTER — PATIENT MESSAGE (OUTPATIENT)
Dept: OTHER | Facility: OTHER | Age: 30
End: 2025-02-16
Payer: COMMERCIAL

## 2025-02-27 ENCOUNTER — ROUTINE PRENATAL (OUTPATIENT)
Dept: OBSTETRICS AND GYNECOLOGY | Facility: CLINIC | Age: 30
End: 2025-02-27
Payer: COMMERCIAL

## 2025-02-27 VITALS — BODY MASS INDEX: 25.14 KG/M2 | WEIGHT: 160.5 LBS | DIASTOLIC BLOOD PRESSURE: 68 MMHG | SYSTOLIC BLOOD PRESSURE: 126 MMHG

## 2025-02-27 DIAGNOSIS — F41.9 ANXIETY IN PREGNANCY, ANTEPARTUM, FIRST TRIMESTER: ICD-10-CM

## 2025-02-27 DIAGNOSIS — Z3A.33 33 WEEKS GESTATION OF PREGNANCY: Primary | ICD-10-CM

## 2025-02-27 DIAGNOSIS — R82.71 GBS BACTERIURIA: ICD-10-CM

## 2025-02-27 DIAGNOSIS — Z87.59 HISTORY OF POSTPARTUM PRE-ECLAMPSIA: ICD-10-CM

## 2025-02-27 DIAGNOSIS — O99.341 ANXIETY IN PREGNANCY, ANTEPARTUM, FIRST TRIMESTER: ICD-10-CM

## 2025-02-27 DIAGNOSIS — Z86.79 HISTORY OF POSTPARTUM PRE-ECLAMPSIA: ICD-10-CM

## 2025-02-27 PROCEDURE — 99999 PR PBB SHADOW E&M-EST. PATIENT-LVL III: CPT | Mod: PBBFAC,,, | Performed by: MIDWIFE

## 2025-02-27 NOTE — PROGRESS NOTES
33 weeks gestation of pregnancy    Doing well, no complaints  Baby very active  Denies CTX, VB, LOF  Has breast pump  Encouraged to start FMLA paperwork  30lb 7.9oz TWG, S=D  FKC's and PTL precautions  RTC in 2 weeks with growth scan, sooner if needed      Anxiety in pregnancy, antepartum    Stable on Zoloft    GBS bacteriuria    Treat in labor    History of postpartum pre-eclampsia     Daily baby ASA

## 2025-03-09 ENCOUNTER — PATIENT MESSAGE (OUTPATIENT)
Dept: OTHER | Facility: OTHER | Age: 30
End: 2025-03-09
Payer: COMMERCIAL

## 2025-03-13 ENCOUNTER — ROUTINE PRENATAL (OUTPATIENT)
Dept: OBSTETRICS AND GYNECOLOGY | Facility: CLINIC | Age: 30
End: 2025-03-13
Payer: COMMERCIAL

## 2025-03-13 ENCOUNTER — PROCEDURE VISIT (OUTPATIENT)
Dept: OBSTETRICS AND GYNECOLOGY | Facility: CLINIC | Age: 30
End: 2025-03-13
Payer: COMMERCIAL

## 2025-03-13 VITALS
WEIGHT: 163.56 LBS | BODY MASS INDEX: 25.62 KG/M2 | SYSTOLIC BLOOD PRESSURE: 116 MMHG | DIASTOLIC BLOOD PRESSURE: 72 MMHG

## 2025-03-13 DIAGNOSIS — O99.341 ANXIETY IN PREGNANCY, ANTEPARTUM, FIRST TRIMESTER: ICD-10-CM

## 2025-03-13 DIAGNOSIS — Z87.59 HISTORY OF POSTPARTUM PRE-ECLAMPSIA: ICD-10-CM

## 2025-03-13 DIAGNOSIS — Z3A.35 35 WEEKS GESTATION OF PREGNANCY: Primary | ICD-10-CM

## 2025-03-13 DIAGNOSIS — R82.71 GBS BACTERIURIA: ICD-10-CM

## 2025-03-13 DIAGNOSIS — Z86.79 HISTORY OF POSTPARTUM PRE-ECLAMPSIA: ICD-10-CM

## 2025-03-13 DIAGNOSIS — F41.9 ANXIETY IN PREGNANCY, ANTEPARTUM, FIRST TRIMESTER: ICD-10-CM

## 2025-03-13 DIAGNOSIS — Z36.89 ENCOUNTER FOR ULTRASOUND TO ASSESS FETAL GROWTH: ICD-10-CM

## 2025-03-13 PROCEDURE — 76816 OB US FOLLOW-UP PER FETUS: CPT | Mod: S$GLB,,, | Performed by: OBSTETRICS & GYNECOLOGY

## 2025-03-13 PROCEDURE — 99999 PR PBB SHADOW E&M-EST. PATIENT-LVL III: CPT | Mod: PBBFAC,,,

## 2025-03-13 NOTE — PROGRESS NOTES
30 y.o. female  at 35w4d   Reports + FM, denies VB, LOF or regular CTX  Doing well without concerns   US reviewed: , vertex, anterior placenta, 3VC, SIMONE wNL, MVP 5.6cm, EFW 2752g (6lb 1oz), 37%ile, AC 73%  TW lbs   GBS: Positive in urine    1. 35 weeks gestation of pregnancy  - routine PNC   2. Anxiety in pregnancy, antepartum, first trimester  Overview:  Zoloft 50mg, stable at this time      3. GBS bacteriuria  Overview:  Treated 24  - treat in labor      4. History of postpartum pre-eclampsia  - taking ASA   - BP WNL       Reviewed warning signs, normal FKCs, labor precautions and how/when to call. Patient states understanding  RTC x 1 wks, call or present sooner prn

## 2025-03-18 ENCOUNTER — ROUTINE PRENATAL (OUTPATIENT)
Dept: OBSTETRICS AND GYNECOLOGY | Facility: CLINIC | Age: 30
End: 2025-03-18
Payer: COMMERCIAL

## 2025-03-18 VITALS — BODY MASS INDEX: 26 KG/M2 | WEIGHT: 166 LBS | DIASTOLIC BLOOD PRESSURE: 70 MMHG | SYSTOLIC BLOOD PRESSURE: 116 MMHG

## 2025-03-18 DIAGNOSIS — Z87.59 HISTORY OF POSTPARTUM PRE-ECLAMPSIA: ICD-10-CM

## 2025-03-18 DIAGNOSIS — F41.9 ANXIETY IN PREGNANCY IN THIRD TRIMESTER, ANTEPARTUM: Primary | ICD-10-CM

## 2025-03-18 DIAGNOSIS — Z34.93 NORMAL PREGNANCY IN THIRD TRIMESTER: ICD-10-CM

## 2025-03-18 DIAGNOSIS — O99.343 ANXIETY IN PREGNANCY IN THIRD TRIMESTER, ANTEPARTUM: Primary | ICD-10-CM

## 2025-03-18 DIAGNOSIS — Z86.79 HISTORY OF POSTPARTUM PRE-ECLAMPSIA: ICD-10-CM

## 2025-03-18 DIAGNOSIS — R82.71 GBS BACTERIURIA: ICD-10-CM

## 2025-03-18 PROCEDURE — 99999 PR PBB SHADOW E&M-EST. PATIENT-LVL III: CPT | Mod: PBBFAC,,,

## 2025-03-18 PROCEDURE — 0502F SUBSEQUENT PRENATAL CARE: CPT | Mod: CPTII,S$GLB,,

## 2025-03-18 NOTE — PROGRESS NOTES
30 y.o. female  at 36w2d  Reports + FM, denies VB, LOF or regular CTX  Doing well without concerns  /70   Wt 75.3 kg (166 lb 0.1 oz)  BMI 26.00 kg/m²    TW lbs   GBS in urine  VE declined    1. Anxiety in pregnancy in third trimester, antepartum  Overview:  Zoloft 50mg, stable at this time      2. GBS bacteriuria  Overview:  Treated 24      3. History of postpartum pre-eclampsia    4. Normal pregnancy in third trimester         Reviewed warning signs, normal FKCs, labor precautions and how/when to call.  RTC x 1 wk, call or present sooner prn.

## 2025-03-19 ENCOUNTER — PATIENT MESSAGE (OUTPATIENT)
Dept: OBSTETRICS AND GYNECOLOGY | Facility: CLINIC | Age: 30
End: 2025-03-19
Payer: COMMERCIAL

## 2025-03-26 ENCOUNTER — ROUTINE PRENATAL (OUTPATIENT)
Dept: OBSTETRICS AND GYNECOLOGY | Facility: CLINIC | Age: 30
End: 2025-03-26
Payer: COMMERCIAL

## 2025-03-26 VITALS
BODY MASS INDEX: 26.21 KG/M2 | DIASTOLIC BLOOD PRESSURE: 74 MMHG | SYSTOLIC BLOOD PRESSURE: 122 MMHG | WEIGHT: 167.31 LBS

## 2025-03-26 DIAGNOSIS — Z87.59 HISTORY OF POSTPARTUM PRE-ECLAMPSIA: Primary | ICD-10-CM

## 2025-03-26 DIAGNOSIS — Z34.93 NORMAL PREGNANCY IN THIRD TRIMESTER: ICD-10-CM

## 2025-03-26 DIAGNOSIS — O99.343 ANXIETY IN PREGNANCY IN THIRD TRIMESTER, ANTEPARTUM: ICD-10-CM

## 2025-03-26 DIAGNOSIS — Z86.79 HISTORY OF POSTPARTUM PRE-ECLAMPSIA: Primary | ICD-10-CM

## 2025-03-26 DIAGNOSIS — R82.71 GBS BACTERIURIA: ICD-10-CM

## 2025-03-26 DIAGNOSIS — F41.9 ANXIETY IN PREGNANCY IN THIRD TRIMESTER, ANTEPARTUM: ICD-10-CM

## 2025-03-26 PROCEDURE — 0502F SUBSEQUENT PRENATAL CARE: CPT | Mod: CPTII,S$GLB,,

## 2025-03-26 PROCEDURE — 99999 PR PBB SHADOW E&M-EST. PATIENT-LVL III: CPT | Mod: PBBFAC,,,

## 2025-03-26 NOTE — PROGRESS NOTES
30 y.o. female  at 37w3d   Reports + FM, denies VB, LOF or regular CTX  Doing well without concerns   /74   Wt 75.9 kg (167 lb 5.3 oz)   BMI 26.21 kg/m²    TW lbs   SVE at request:     1. History of postpartum pre-eclampsia    2. Normal pregnancy in third trimester    3. GBS bacteriuria  Overview:  Treated 24      4. Anxiety in pregnancy in third trimester, antepartum  Overview:  Zoloft 50mg, stable at this time           Reviewed warning signs, normal FKCs, labor precautions and how/when to call.  RTC x 1 wk, call or present sooner prn.

## 2025-04-02 ENCOUNTER — ROUTINE PRENATAL (OUTPATIENT)
Dept: OBSTETRICS AND GYNECOLOGY | Facility: CLINIC | Age: 30
End: 2025-04-02
Payer: COMMERCIAL

## 2025-04-02 VITALS
SYSTOLIC BLOOD PRESSURE: 130 MMHG | BODY MASS INDEX: 26.38 KG/M2 | WEIGHT: 168.44 LBS | DIASTOLIC BLOOD PRESSURE: 70 MMHG

## 2025-04-02 DIAGNOSIS — Z34.93 NORMAL PREGNANCY IN THIRD TRIMESTER: ICD-10-CM

## 2025-04-02 DIAGNOSIS — R82.71 GBS BACTERIURIA: ICD-10-CM

## 2025-04-02 DIAGNOSIS — O99.343 ANXIETY IN PREGNANCY IN THIRD TRIMESTER, ANTEPARTUM: Primary | ICD-10-CM

## 2025-04-02 DIAGNOSIS — Z86.79 HISTORY OF POSTPARTUM PRE-ECLAMPSIA: ICD-10-CM

## 2025-04-02 DIAGNOSIS — F41.9 ANXIETY IN PREGNANCY IN THIRD TRIMESTER, ANTEPARTUM: Primary | ICD-10-CM

## 2025-04-02 DIAGNOSIS — Z87.59 HISTORY OF POSTPARTUM PRE-ECLAMPSIA: ICD-10-CM

## 2025-04-02 PROCEDURE — 99999 PR PBB SHADOW E&M-EST. PATIENT-LVL III: CPT | Mod: PBBFAC,,,

## 2025-04-02 PROCEDURE — 0502F SUBSEQUENT PRENATAL CARE: CPT | Mod: CPTII,S$GLB,,

## 2025-04-02 NOTE — PROGRESS NOTES
30 y.o. female  at 38w3d   Reports + FM, denies VB, LOF or regular CTX  Doing well without concerns   /70   Wt 76.4 kg (168 lb 6.9 oz)  BMI 26.38 kg/m²    TW lbs   SVE at request /3    1. Anxiety in pregnancy in third trimester, antepartum  Overview:  Zoloft 50mg, stable at this time      2. GBS bacteriuria  Overview:  Treated 24      3. Normal pregnancy in third trimester    4. History of postpartum pre-eclampsia         Reviewed warning signs, normal FKCs, labor precautions and how/when to call.  RTC x 1 wk, call or present sooner prn.

## 2025-04-09 ENCOUNTER — ROUTINE PRENATAL (OUTPATIENT)
Dept: OBSTETRICS AND GYNECOLOGY | Facility: CLINIC | Age: 30
End: 2025-04-09
Payer: COMMERCIAL

## 2025-04-09 VITALS — SYSTOLIC BLOOD PRESSURE: 118 MMHG | DIASTOLIC BLOOD PRESSURE: 74 MMHG | BODY MASS INDEX: 26.31 KG/M2 | WEIGHT: 168 LBS

## 2025-04-09 DIAGNOSIS — O99.343 ANXIETY IN PREGNANCY IN THIRD TRIMESTER, ANTEPARTUM: ICD-10-CM

## 2025-04-09 DIAGNOSIS — Z86.79 HISTORY OF POSTPARTUM PRE-ECLAMPSIA: Primary | ICD-10-CM

## 2025-04-09 DIAGNOSIS — Z34.93 NORMAL PREGNANCY IN THIRD TRIMESTER: ICD-10-CM

## 2025-04-09 DIAGNOSIS — F41.9 ANXIETY IN PREGNANCY IN THIRD TRIMESTER, ANTEPARTUM: ICD-10-CM

## 2025-04-09 DIAGNOSIS — Z87.59 HISTORY OF POSTPARTUM PRE-ECLAMPSIA: Primary | ICD-10-CM

## 2025-04-09 DIAGNOSIS — R82.71 GBS BACTERIURIA: ICD-10-CM

## 2025-04-09 PROCEDURE — 0502F SUBSEQUENT PRENATAL CARE: CPT | Mod: CPTII,S$GLB,,

## 2025-04-09 PROCEDURE — 99999 PR PBB SHADOW E&M-EST. PATIENT-LVL II: CPT | Mod: PBBFAC,,,

## 2025-04-09 NOTE — PROGRESS NOTES
30 y.o. female  at 39w3d   Reports + FM, denies VB, LOF or regular CTX  Doing well without concerns   TW lbs   SVE at request 2.5/80/-1    1. History of postpartum pre-eclampsia    2. Normal pregnancy in third trimester    3. GBS bacteriuria  Overview:  Treated 24      4. Anxiety in pregnancy in third trimester, antepartum  Overview:  Zoloft 50mg, stable at this time         Reviewed warning signs, normal FKCs, labor precautions and how/when to call.  RTC x 1 wks, call or present sooner prn.

## 2025-04-15 ENCOUNTER — ROUTINE PRENATAL (OUTPATIENT)
Dept: OBSTETRICS AND GYNECOLOGY | Facility: CLINIC | Age: 30
End: 2025-04-15
Payer: COMMERCIAL

## 2025-04-15 VITALS
BODY MASS INDEX: 16.26 KG/M2 | WEIGHT: 103.81 LBS | DIASTOLIC BLOOD PRESSURE: 77 MMHG | SYSTOLIC BLOOD PRESSURE: 124 MMHG

## 2025-04-15 DIAGNOSIS — Z86.79 HISTORY OF POSTPARTUM PRE-ECLAMPSIA: ICD-10-CM

## 2025-04-15 DIAGNOSIS — O99.343 ANXIETY IN PREGNANCY IN THIRD TRIMESTER, ANTEPARTUM: ICD-10-CM

## 2025-04-15 DIAGNOSIS — O48.0 POST-TERM PREGNANCY, 40-42 WEEKS OF GESTATION: Primary | ICD-10-CM

## 2025-04-15 DIAGNOSIS — Z34.93 NORMAL PREGNANCY IN THIRD TRIMESTER: ICD-10-CM

## 2025-04-15 DIAGNOSIS — R82.71 GBS BACTERIURIA: ICD-10-CM

## 2025-04-15 DIAGNOSIS — F41.9 ANXIETY IN PREGNANCY IN THIRD TRIMESTER, ANTEPARTUM: ICD-10-CM

## 2025-04-15 DIAGNOSIS — Z87.59 HISTORY OF POSTPARTUM PRE-ECLAMPSIA: ICD-10-CM

## 2025-04-15 PROCEDURE — 99999 PR PBB SHADOW E&M-EST. PATIENT-LVL III: CPT | Mod: PBBFAC,,,

## 2025-04-15 PROCEDURE — 0502F SUBSEQUENT PRENATAL CARE: CPT | Mod: CPTII,S$GLB,,

## 2025-04-15 RX ORDER — MUPIROCIN 20 MG/G
OINTMENT TOPICAL
Status: CANCELLED | OUTPATIENT
Start: 2025-04-15

## 2025-04-15 RX ORDER — SIMETHICONE 80 MG
1 TABLET,CHEWABLE ORAL 4 TIMES DAILY PRN
Status: CANCELLED | OUTPATIENT
Start: 2025-04-15

## 2025-04-15 RX ORDER — LIDOCAINE HYDROCHLORIDE 10 MG/ML
10 INJECTION, SOLUTION INFILTRATION; PERINEURAL ONCE AS NEEDED
Status: CANCELLED | OUTPATIENT
Start: 2025-04-15 | End: 2036-09-11

## 2025-04-15 RX ORDER — DIPHENOXYLATE HYDROCHLORIDE AND ATROPINE SULFATE 2.5; .025 MG/1; MG/1
2 TABLET ORAL EVERY 6 HOURS PRN
Status: CANCELLED | OUTPATIENT
Start: 2025-04-15

## 2025-04-15 RX ORDER — CARBOPROST TROMETHAMINE 250 UG/ML
250 INJECTION, SOLUTION INTRAMUSCULAR
Status: CANCELLED | OUTPATIENT
Start: 2025-04-15

## 2025-04-15 RX ORDER — SODIUM CHLORIDE, SODIUM LACTATE, POTASSIUM CHLORIDE, CALCIUM CHLORIDE 600; 310; 30; 20 MG/100ML; MG/100ML; MG/100ML; MG/100ML
INJECTION, SOLUTION INTRAVENOUS CONTINUOUS
Status: CANCELLED | OUTPATIENT
Start: 2025-04-15

## 2025-04-15 RX ORDER — CALCIUM CARBONATE 200(500)MG
500 TABLET,CHEWABLE ORAL 3 TIMES DAILY PRN
Status: CANCELLED | OUTPATIENT
Start: 2025-04-15

## 2025-04-15 RX ORDER — ONDANSETRON 8 MG/1
8 TABLET, ORALLY DISINTEGRATING ORAL EVERY 8 HOURS PRN
Status: CANCELLED | OUTPATIENT
Start: 2025-04-15

## 2025-04-15 RX ORDER — OXYTOCIN/0.9 % SODIUM CHLORIDE 15/250 ML
95 PLASTIC BAG, INJECTION (ML) INTRAVENOUS CONTINUOUS PRN
Status: CANCELLED | OUTPATIENT
Start: 2025-04-15

## 2025-04-15 RX ORDER — MISOPROSTOL 200 UG/1
800 TABLET ORAL ONCE AS NEEDED
Status: CANCELLED | OUTPATIENT
Start: 2025-04-15 | End: 2036-09-11

## 2025-04-15 RX ORDER — METHYLERGONOVINE MALEATE 0.2 MG/ML
200 INJECTION INTRAVENOUS ONCE AS NEEDED
Status: CANCELLED | OUTPATIENT
Start: 2025-04-15 | End: 2036-09-11

## 2025-04-15 RX ORDER — OXYTOCIN 10 [USP'U]/ML
10 INJECTION, SOLUTION INTRAMUSCULAR; INTRAVENOUS ONCE AS NEEDED
Status: CANCELLED | OUTPATIENT
Start: 2025-04-15 | End: 2036-09-11

## 2025-04-15 RX ORDER — MISOPROSTOL 200 UG/1
800 TABLET ORAL ONCE AS NEEDED
Status: CANCELLED | OUTPATIENT
Start: 2025-04-15

## 2025-04-15 RX ORDER — OXYTOCIN/0.9 % SODIUM CHLORIDE 15/250 ML
0-32 PLASTIC BAG, INJECTION (ML) INTRAVENOUS CONTINUOUS
Status: CANCELLED | OUTPATIENT
Start: 2025-04-15

## 2025-04-15 RX ORDER — OXYTOCIN-SODIUM CHLORIDE 0.9% IV SOLN 30 UNIT/500ML 30-0.9/5 UT/ML-%
10 SOLUTION INTRAVENOUS ONCE AS NEEDED
Status: CANCELLED | OUTPATIENT
Start: 2025-04-15 | End: 2036-09-11

## 2025-04-15 RX ORDER — OXYTOCIN/0.9 % SODIUM CHLORIDE 15/250 ML
95 PLASTIC BAG, INJECTION (ML) INTRAVENOUS ONCE AS NEEDED
Status: CANCELLED | OUTPATIENT
Start: 2025-04-15 | End: 2036-09-11

## 2025-04-15 RX ORDER — OXYTOCIN-SODIUM CHLORIDE 0.9% IV SOLN 30 UNIT/500ML 30-0.9/5 UT/ML-%
30 SOLUTION INTRAVENOUS ONCE AS NEEDED
Status: CANCELLED | OUTPATIENT
Start: 2025-04-15 | End: 2036-09-11

## 2025-04-15 RX ORDER — AZELAIC ACID 0.15 G/G
GEL TOPICAL 2 TIMES DAILY
COMMUNITY
Start: 2025-02-06

## 2025-04-15 NOTE — PROGRESS NOTES
30 y.o. female  at 40w2d   Reports + FM, denies VB, LOF or regular CTX  Doing well without concerns   TWG: -26 lbs   SVE at request 3/80/-2        1. Post-term pregnancy, 40-42 weeks of gestation  -     IP OB Labor Induction; Future  -     Vital Signs; Standing  -     Vital signs- Early Labor(0-4 cm); Standing  -     Vital Signs- Active Labor (4-10 cm); Standing  -     Vital Signs Post Delivery; Standing  -     Check Temperature, Membranes Intact; Standing  -     Check Temperature, Membranes Ruptured; Standing  -     Pulse Oximetry Continous while CONTINUOUS electronic fetal Monitor in use; Standing  -     Cervical Exam; Standing  -     Fetal / Uterine Monitoring - Continuous; Standing  -     Fetal monitoring - scalp electrode; Standing  -     Insert peripheral IV; Standing  -     Barajas to Gravity; Standing  -     Barajas Catheter Care every 12 hours; Standing  -     Nurse to discontinue barajas when patient no longer meets criteria; Standing  -     Post Barajas Catheter Removal Protocol; Standing  -     Perform Bladder scan; Standing  -     Perform Intermittent Catheterization; Standing  -     Perform Bladder Scan, post intermittent cath; Standing  -     Place indwelling catheter; Standing  -     Watch for excessive vaginal bleeding; Standing  -     Chlorhexidine Bath; Standing  -     Decrease Oxytocin; Standing  -     Discontinue oxytocin; Standing  -     Oxytocin Titration Resumption; Standing  -     Amnioinfusion PRN recurrent variable decelerations; Standing  -     Administer a 500 -1000 ml IV fluid bolus as needed for; Standing  -     Assess fundal height/uterine firmness, lochia, perineum; Standing  -     Notify Physician; Standing  -     Notify OB care provider; Standing  -     Notify physician of cervical change or lack of change; Standing  -     Notify physician for excessive uterine activity; Standing  -     Notify physician for Fetal Heart Tones consistent with Category II or Category III tracing;  Standing  -     Notify Pediatrician after delivery; Standing  -     Notify Nursery / Level II Nursery; Standing  -     Notify Nursery / Level II Nursery; Standing  -     Abdominal prep for  Section; Standing  -     Chlorhexidine (CHG) 2% Wipes; Standing  -     Chlorohexidine Gluconate Bath; Standing  -     Vital signs every 15 minutes; Standing  -     Vital signs every 15 minutes; Standing  -     miSOPROStoL tablet 800 mcg  -     miSOPROStoL tablet 800 mcg  -     CBC with Auto Differential; Standing  -     Comprehensive metabolic panel; Standing  -     Type & Screen, Labor & Delivery; Standing  -     Treponema Pallidium Antibodies IgG, IgM; Standing  -     HIV 1/2 Ag/Ab (4th Gen); Standing  -     POCT Cord Blood Gas Umbilical Artery Cord Gas; Standing  -     POCT Cord Blood Gas Umbilical Vein Cord Gas; Standing  -     Full code; Standing  -     Admit to Inpatient; Standing  -     Reason for no VTE Prophylaxis; Standing  -     Decrease Oxytocin by 2 mu/min PRN for:; Standing  -     Decrease the oxytocin by 50% if the FHR tracing shows no improvement or there is no resolution of tachysystole within 30 minutes or if the FHR tracing worsens despite the reduction by 2mU/min; Standing  -     Discontinue the oxytocin infusion if the non-reassuring FHR tracing or tachysystole do not resolve within 30 minutes or worsens despite the 50% reduction in the oxytocin rate; Standing  -     For tachysystole :; Standing  -     Decrease Oxytocin by 2 mu/min PRN for tachysystole that persists after lateral position change and (if not contraindicated) an IV fluid bolus; Standing  -     Decrease Oxytocin by 50% if tachysystole persists after decreasing by 2 mu/min after 30 minutes.; Standing  -     Discontinue Oxytocin PRN if tachysystole persists after decreasing Oxytocin by 50% after 30 minutes.; Standing  -     Resume Oxytocin when uterine activity is appropriate and fetal oxygenation is demonstrated.; Standing  -     Notify  OB; Standing    2. History of postpartum pre-eclampsia    3. Normal pregnancy in third trimester    4. GBS bacteriuria  Overview:  Treated 8/16/24      5. Anxiety in pregnancy in third trimester, antepartum  Overview:  Zoloft 50mg, stable at this time      Other orders  -     Change position, roll left or right; Standing  -     lactated ringers bolus 1,000 mL  -     lactated ringers bolus 500 mL  -     lactated ringers infusion  -     IP VTE LOW RISK PATIENT; Standing  -     mupirocin 2 % ointment  -     oxytocin 15 units/250 mL (60 milliunits/mL) in 0.9% NaCl IV bolus from bag  -     oxytocin 15 units/250 mL (60 milliunits/mL) in 0.9% NaCl infusion (non-titrating)  -     ondansetron disintegrating tablet 8 mg  -     calcium carbonate 200 mg calcium (500 mg) chewable tablet 500 mg  -     simethicone chewable tablet 80 mg  -     LIDOcaine HCL 10 mg/ml (1%) injection 10 mL  -     oxytocin 15 units/250 mL (60 milliunits/mL) in 0.9% NaCl IV bolus from bag  -     oxytocin 15 units/250 mL (60 milliunits/mL) in 0.9% NaCl infusion (non-titrating)  -     oxytocin injection 10 Units  -     methylergonovine injection 200 mcg  -     carboprost injection 250 mcg  -     tranexamic acid (CYKLOKAPRON) 1,000 mg in 0.9% NaCl 100 mL  -     diphenoxylate-atropine 2.5-0.025 mg per tablet 2 tablet  -     penicillin G potassium 5 Million Units in D5W 100 mL IVPB  -     penicillin G potassium 3 Million Units in D5W 100 mL IVPB  -     lactated ringers bolus 500 mL  -     oxytocin 15 units/250 mL (60 milliunits/mL) in 0.9% NaCl infusion (titrating)         Reviewed GBS pos in urine will treat in labor and repeat HIV/RPR on admission  Scheduled IOL 4/17/2025 @ 0700      Reviewed warning signs, normal FKCs, labor precautions and how/when to call.  RTC x 3 days, call or present sooner prn.

## 2025-04-17 ENCOUNTER — ANESTHESIA EVENT (OUTPATIENT)
Dept: OBSTETRICS AND GYNECOLOGY | Facility: HOSPITAL | Age: 30
End: 2025-04-17
Payer: COMMERCIAL

## 2025-04-17 ENCOUNTER — ANESTHESIA (OUTPATIENT)
Dept: OBSTETRICS AND GYNECOLOGY | Facility: HOSPITAL | Age: 30
End: 2025-04-17
Payer: COMMERCIAL

## 2025-04-17 ENCOUNTER — HOSPITAL ENCOUNTER (INPATIENT)
Facility: HOSPITAL | Age: 30
LOS: 2 days | Discharge: HOME | End: 2025-04-19
Attending: OBSTETRICS & GYNECOLOGY | Admitting: OBSTETRICS & GYNECOLOGY
Payer: COMMERCIAL

## 2025-04-17 DIAGNOSIS — O48.0 POST-TERM PREGNANCY, 40-42 WEEKS OF GESTATION: ICD-10-CM

## 2025-04-17 LAB
ABSOLUTE EOSINOPHIL (OHS): 0.08 K/UL
ABSOLUTE MONOCYTE (OHS): 0.73 K/UL (ref 0.3–1)
ABSOLUTE NEUTROPHIL COUNT (OHS): 5.94 K/UL (ref 1.8–7.7)
ALBUMIN SERPL BCP-MCNC: 3 G/DL (ref 3.5–5.2)
ALP SERPL-CCNC: 178 UNIT/L (ref 40–150)
ALT SERPL W/O P-5'-P-CCNC: 5 UNIT/L (ref 10–44)
ANION GAP (OHS): 9 MMOL/L (ref 8–16)
AST SERPL-CCNC: 18 UNIT/L (ref 11–45)
BASOPHILS # BLD AUTO: 0.04 K/UL
BASOPHILS NFR BLD AUTO: 0.4 %
BILIRUB SERPL-MCNC: 0.6 MG/DL (ref 0.1–1)
BUN SERPL-MCNC: 7 MG/DL (ref 6–20)
CALCIUM SERPL-MCNC: 9.4 MG/DL (ref 8.7–10.5)
CHLORIDE SERPL-SCNC: 107 MMOL/L (ref 95–110)
CO2 SERPL-SCNC: 19 MMOL/L (ref 23–29)
CREAT SERPL-MCNC: 0.6 MG/DL (ref 0.5–1.4)
ERYTHROCYTE [DISTWIDTH] IN BLOOD BY AUTOMATED COUNT: 12.5 % (ref 11.5–14.5)
GFR SERPLBLD CREATININE-BSD FMLA CKD-EPI: >60 ML/MIN/1.73/M2
GLUCOSE SERPL-MCNC: 97 MG/DL (ref 70–110)
GROUP & RH: NORMAL
HCT VFR BLD AUTO: 33.6 % (ref 37–48.5)
HGB BLD-MCNC: 11.4 GM/DL (ref 12–16)
HIV 1+2 AB+HIV1 P24 AG SERPL QL IA: NEGATIVE
IMM GRANULOCYTES # BLD AUTO: 0.05 K/UL (ref 0–0.04)
IMM GRANULOCYTES NFR BLD AUTO: 0.6 % (ref 0–0.5)
INDIRECT COOMBS: NORMAL
LYMPHOCYTES # BLD AUTO: 2.25 K/UL (ref 1–4.8)
MCH RBC QN AUTO: 29.7 PG (ref 27–31)
MCHC RBC AUTO-ENTMCNC: 33.9 G/DL (ref 32–36)
MCV RBC AUTO: 88 FL (ref 82–98)
NUCLEATED RBC (/100WBC) (OHS): 0 /100 WBC
PLATELET # BLD AUTO: 258 K/UL (ref 150–450)
PMV BLD AUTO: 10.3 FL (ref 9.2–12.9)
POTASSIUM SERPL-SCNC: 3.9 MMOL/L (ref 3.5–5.1)
PROT SERPL-MCNC: 7.1 GM/DL (ref 6–8.4)
RBC # BLD AUTO: 3.84 M/UL (ref 4–5.4)
RELATIVE EOSINOPHIL (OHS): 0.9 %
RELATIVE LYMPHOCYTE (OHS): 24.8 % (ref 18–48)
RELATIVE MONOCYTE (OHS): 8 % (ref 4–15)
RELATIVE NEUTROPHIL (OHS): 65.3 % (ref 38–73)
SODIUM SERPL-SCNC: 135 MMOL/L (ref 136–145)
T PALLIDUM IGG+IGM SER QL: NORMAL
WBC # BLD AUTO: 9.09 K/UL (ref 3.9–12.7)

## 2025-04-17 PROCEDURE — 80053 COMPREHEN METABOLIC PANEL: CPT

## 2025-04-17 PROCEDURE — 27200710 HC EPIDURAL INFUSION PUMP SET: Performed by: STUDENT IN AN ORGANIZED HEALTH CARE EDUCATION/TRAINING PROGRAM

## 2025-04-17 PROCEDURE — 85025 COMPLETE CBC W/AUTO DIFF WBC: CPT

## 2025-04-17 PROCEDURE — 25000003 PHARM REV CODE 250: Performed by: ADVANCED PRACTICE MIDWIFE

## 2025-04-17 PROCEDURE — 63600175 PHARM REV CODE 636 W HCPCS: Performed by: ADVANCED PRACTICE MIDWIFE

## 2025-04-17 PROCEDURE — 63600175 PHARM REV CODE 636 W HCPCS

## 2025-04-17 PROCEDURE — 3E033VJ INTRODUCTION OF OTHER HORMONE INTO PERIPHERAL VEIN, PERCUTANEOUS APPROACH: ICD-10-PCS | Performed by: OBSTETRICS & GYNECOLOGY

## 2025-04-17 PROCEDURE — 51702 INSERT TEMP BLADDER CATH: CPT

## 2025-04-17 PROCEDURE — 72200005 HC VAGINAL DELIVERY LEVEL II

## 2025-04-17 PROCEDURE — 59400 OBSTETRICAL CARE: CPT | Mod: GB,,, | Performed by: ADVANCED PRACTICE MIDWIFE

## 2025-04-17 PROCEDURE — 27800516 HC TRAY, EPIDURAL COMBO: Performed by: STUDENT IN AN ORGANIZED HEALTH CARE EDUCATION/TRAINING PROGRAM

## 2025-04-17 PROCEDURE — 72100002 HC LABOR CARE, 1ST 8 HOURS

## 2025-04-17 PROCEDURE — 87389 HIV-1 AG W/HIV-1&-2 AB AG IA: CPT

## 2025-04-17 PROCEDURE — 10907ZC DRAINAGE OF AMNIOTIC FLUID, THERAPEUTIC FROM PRODUCTS OF CONCEPTION, VIA NATURAL OR ARTIFICIAL OPENING: ICD-10-PCS | Performed by: OBSTETRICS & GYNECOLOGY

## 2025-04-17 PROCEDURE — 63600175 PHARM REV CODE 636 W HCPCS: Performed by: NURSE ANESTHETIST, CERTIFIED REGISTERED

## 2025-04-17 PROCEDURE — 11000001 HC ACUTE MED/SURG PRIVATE ROOM

## 2025-04-17 PROCEDURE — 62326 NJX INTERLAMINAR LMBR/SAC: CPT | Performed by: NURSE ANESTHETIST, CERTIFIED REGISTERED

## 2025-04-17 PROCEDURE — 63600175 PHARM REV CODE 636 W HCPCS: Performed by: STUDENT IN AN ORGANIZED HEALTH CARE EDUCATION/TRAINING PROGRAM

## 2025-04-17 PROCEDURE — 86593 SYPHILIS TEST NON-TREP QUANT: CPT

## 2025-04-17 PROCEDURE — 86901 BLOOD TYPING SEROLOGIC RH(D): CPT

## 2025-04-17 RX ORDER — ROPIVACAINE HYDROCHLORIDE 2 MG/ML
12 INJECTION, SOLUTION EPIDURAL; INFILTRATION CONTINUOUS
Status: DISCONTINUED | OUTPATIENT
Start: 2025-04-17 | End: 2025-04-17

## 2025-04-17 RX ORDER — CALCIUM CARBONATE 200(500)MG
500 TABLET,CHEWABLE ORAL 3 TIMES DAILY PRN
Status: DISCONTINUED | OUTPATIENT
Start: 2025-04-17 | End: 2025-04-17

## 2025-04-17 RX ORDER — OXYTOCIN 10 [USP'U]/ML
10 INJECTION, SOLUTION INTRAMUSCULAR; INTRAVENOUS ONCE AS NEEDED
Status: DISCONTINUED | OUTPATIENT
Start: 2025-04-17 | End: 2025-04-17 | Stop reason: SDUPTHER

## 2025-04-17 RX ORDER — HYDROCODONE BITARTRATE AND ACETAMINOPHEN 5; 325 MG/1; MG/1
1 TABLET ORAL EVERY 4 HOURS PRN
Status: DISCONTINUED | OUTPATIENT
Start: 2025-04-17 | End: 2025-04-19 | Stop reason: HOSPADM

## 2025-04-17 RX ORDER — METHYLERGONOVINE MALEATE 0.2 MG/ML
200 INJECTION INTRAVENOUS ONCE AS NEEDED
Status: DISCONTINUED | OUTPATIENT
Start: 2025-04-17 | End: 2025-04-17 | Stop reason: SDUPTHER

## 2025-04-17 RX ORDER — OXYTOCIN-SODIUM CHLORIDE 0.9% IV SOLN 30 UNIT/500ML 30-0.9/5 UT/ML-%
10 SOLUTION INTRAVENOUS ONCE AS NEEDED
Status: DISCONTINUED | OUTPATIENT
Start: 2025-04-17 | End: 2025-04-17 | Stop reason: SDUPTHER

## 2025-04-17 RX ORDER — DIPHENHYDRAMINE HCL 25 MG
25 CAPSULE ORAL EVERY 4 HOURS PRN
Status: DISCONTINUED | OUTPATIENT
Start: 2025-04-17 | End: 2025-04-19 | Stop reason: HOSPADM

## 2025-04-17 RX ORDER — OXYTOCIN-SODIUM CHLORIDE 0.9% IV SOLN 30 UNIT/500ML 30-0.9/5 UT/ML-%
30 SOLUTION INTRAVENOUS ONCE AS NEEDED
Status: DISCONTINUED | OUTPATIENT
Start: 2025-04-17 | End: 2025-04-19 | Stop reason: HOSPADM

## 2025-04-17 RX ORDER — HYDROCORTISONE 25 MG/G
CREAM TOPICAL 3 TIMES DAILY PRN
Status: DISCONTINUED | OUTPATIENT
Start: 2025-04-17 | End: 2025-04-19 | Stop reason: HOSPADM

## 2025-04-17 RX ORDER — METHYLERGONOVINE MALEATE 0.2 MG/ML
200 INJECTION INTRAVENOUS ONCE AS NEEDED
Status: DISCONTINUED | OUTPATIENT
Start: 2025-04-17 | End: 2025-04-19 | Stop reason: HOSPADM

## 2025-04-17 RX ORDER — TRANEXAMIC ACID 10 MG/ML
1000 INJECTION, SOLUTION INTRAVENOUS EVERY 30 MIN PRN
Status: DISCONTINUED | OUTPATIENT
Start: 2025-04-17 | End: 2025-04-19 | Stop reason: HOSPADM

## 2025-04-17 RX ORDER — ACETAMINOPHEN 325 MG/1
650 TABLET ORAL EVERY 6 HOURS SCHEDULED
Status: DISCONTINUED | OUTPATIENT
Start: 2025-04-17 | End: 2025-04-19 | Stop reason: HOSPADM

## 2025-04-17 RX ORDER — CARBOPROST TROMETHAMINE 250 UG/ML
250 INJECTION, SOLUTION INTRAMUSCULAR
Status: DISCONTINUED | OUTPATIENT
Start: 2025-04-17 | End: 2025-04-17 | Stop reason: SDUPTHER

## 2025-04-17 RX ORDER — SODIUM CHLORIDE 0.9 % (FLUSH) 0.9 %
10 SYRINGE (ML) INJECTION
Status: DISCONTINUED | OUTPATIENT
Start: 2025-04-17 | End: 2025-04-19 | Stop reason: HOSPADM

## 2025-04-17 RX ORDER — DOCUSATE SODIUM 100 MG/1
200 CAPSULE, LIQUID FILLED ORAL 2 TIMES DAILY PRN
Status: DISCONTINUED | OUTPATIENT
Start: 2025-04-17 | End: 2025-04-19 | Stop reason: HOSPADM

## 2025-04-17 RX ORDER — OXYTOCIN-SODIUM CHLORIDE 0.9% IV SOLN 30 UNIT/500ML 30-0.9/5 UT/ML-%
95 SOLUTION INTRAVENOUS ONCE AS NEEDED
Status: DISCONTINUED | OUTPATIENT
Start: 2025-04-17 | End: 2025-04-17 | Stop reason: SDUPTHER

## 2025-04-17 RX ORDER — OXYTOCIN-SODIUM CHLORIDE 0.9% IV SOLN 30 UNIT/500ML 30-0.9/5 UT/ML-%
95 SOLUTION INTRAVENOUS CONTINUOUS PRN
Status: DISCONTINUED | OUTPATIENT
Start: 2025-04-17 | End: 2025-04-17 | Stop reason: SDUPTHER

## 2025-04-17 RX ORDER — OXYTOCIN-SODIUM CHLORIDE 0.9% IV SOLN 30 UNIT/500ML 30-0.9/5 UT/ML-%
95 SOLUTION INTRAVENOUS CONTINUOUS PRN
Status: DISCONTINUED | OUTPATIENT
Start: 2025-04-17 | End: 2025-04-19 | Stop reason: HOSPADM

## 2025-04-17 RX ORDER — MISOPROSTOL 200 UG/1
800 TABLET ORAL ONCE AS NEEDED
Status: DISCONTINUED | OUTPATIENT
Start: 2025-04-17 | End: 2025-04-19 | Stop reason: HOSPADM

## 2025-04-17 RX ORDER — OXYTOCIN 10 [USP'U]/ML
10 INJECTION, SOLUTION INTRAMUSCULAR; INTRAVENOUS ONCE AS NEEDED
Status: DISCONTINUED | OUTPATIENT
Start: 2025-04-17 | End: 2025-04-19 | Stop reason: HOSPADM

## 2025-04-17 RX ORDER — DIPHENOXYLATE HYDROCHLORIDE AND ATROPINE SULFATE 2.5; .025 MG/1; MG/1
2 TABLET ORAL EVERY 6 HOURS PRN
Status: DISCONTINUED | OUTPATIENT
Start: 2025-04-17 | End: 2025-04-17 | Stop reason: SDUPTHER

## 2025-04-17 RX ORDER — OXYTOCIN-SODIUM CHLORIDE 0.9% IV SOLN 30 UNIT/500ML 30-0.9/5 UT/ML-%
0-32 SOLUTION INTRAVENOUS CONTINUOUS
Status: DISCONTINUED | OUTPATIENT
Start: 2025-04-17 | End: 2025-04-17

## 2025-04-17 RX ORDER — IBUPROFEN 600 MG/1
600 TABLET ORAL EVERY 6 HOURS
Status: DISCONTINUED | OUTPATIENT
Start: 2025-04-17 | End: 2025-04-19 | Stop reason: HOSPADM

## 2025-04-17 RX ORDER — HYDROCODONE BITARTRATE AND ACETAMINOPHEN 7.5; 325 MG/1; MG/1
1 TABLET ORAL EVERY 4 HOURS PRN
Status: DISCONTINUED | OUTPATIENT
Start: 2025-04-17 | End: 2025-04-19 | Stop reason: HOSPADM

## 2025-04-17 RX ORDER — CARBOPROST TROMETHAMINE 250 UG/ML
250 INJECTION, SOLUTION INTRAMUSCULAR
Status: DISCONTINUED | OUTPATIENT
Start: 2025-04-17 | End: 2025-04-19 | Stop reason: HOSPADM

## 2025-04-17 RX ORDER — OXYTOCIN-SODIUM CHLORIDE 0.9% IV SOLN 30 UNIT/500ML 30-0.9/5 UT/ML-%
95 SOLUTION INTRAVENOUS ONCE AS NEEDED
Status: DISCONTINUED | OUTPATIENT
Start: 2025-04-17 | End: 2025-04-19 | Stop reason: HOSPADM

## 2025-04-17 RX ORDER — DIPHENOXYLATE HYDROCHLORIDE AND ATROPINE SULFATE 2.5; .025 MG/1; MG/1
2 TABLET ORAL EVERY 6 HOURS PRN
Status: DISCONTINUED | OUTPATIENT
Start: 2025-04-17 | End: 2025-04-19 | Stop reason: HOSPADM

## 2025-04-17 RX ORDER — FAMOTIDINE 10 MG/ML
20 INJECTION, SOLUTION INTRAVENOUS ONCE
Status: DISCONTINUED | OUTPATIENT
Start: 2025-04-17 | End: 2025-04-17

## 2025-04-17 RX ORDER — TRANEXAMIC ACID 10 MG/ML
1000 INJECTION, SOLUTION INTRAVENOUS EVERY 30 MIN PRN
Status: DISCONTINUED | OUTPATIENT
Start: 2025-04-17 | End: 2025-04-17 | Stop reason: SDUPTHER

## 2025-04-17 RX ORDER — SIMETHICONE 80 MG
1 TABLET,CHEWABLE ORAL EVERY 6 HOURS PRN
Status: DISCONTINUED | OUTPATIENT
Start: 2025-04-17 | End: 2025-04-19 | Stop reason: HOSPADM

## 2025-04-17 RX ORDER — SIMETHICONE 80 MG
1 TABLET,CHEWABLE ORAL 4 TIMES DAILY PRN
Status: DISCONTINUED | OUTPATIENT
Start: 2025-04-17 | End: 2025-04-17 | Stop reason: SDUPTHER

## 2025-04-17 RX ORDER — MUPIROCIN 20 MG/G
OINTMENT TOPICAL
Status: DISCONTINUED | OUTPATIENT
Start: 2025-04-17 | End: 2025-04-17

## 2025-04-17 RX ORDER — SODIUM CHLORIDE, SODIUM LACTATE, POTASSIUM CHLORIDE, CALCIUM CHLORIDE 600; 310; 30; 20 MG/100ML; MG/100ML; MG/100ML; MG/100ML
INJECTION, SOLUTION INTRAVENOUS CONTINUOUS
Status: DISCONTINUED | OUTPATIENT
Start: 2025-04-17 | End: 2025-04-17

## 2025-04-17 RX ORDER — MISOPROSTOL 200 UG/1
800 TABLET ORAL ONCE AS NEEDED
Status: DISCONTINUED | OUTPATIENT
Start: 2025-04-17 | End: 2025-04-17 | Stop reason: SDUPTHER

## 2025-04-17 RX ORDER — SODIUM CITRATE AND CITRIC ACID MONOHYDRATE 334; 500 MG/5ML; MG/5ML
30 SOLUTION ORAL ONCE
Status: DISCONTINUED | OUTPATIENT
Start: 2025-04-17 | End: 2025-04-17

## 2025-04-17 RX ORDER — DIPHENHYDRAMINE HYDROCHLORIDE 50 MG/ML
25 INJECTION, SOLUTION INTRAMUSCULAR; INTRAVENOUS EVERY 4 HOURS PRN
Status: DISCONTINUED | OUTPATIENT
Start: 2025-04-17 | End: 2025-04-19 | Stop reason: HOSPADM

## 2025-04-17 RX ORDER — LIDOCAINE HYDROCHLORIDE 10 MG/ML
10 INJECTION, SOLUTION EPIDURAL; INFILTRATION; INTRACAUDAL; PERINEURAL ONCE AS NEEDED
Status: DISCONTINUED | OUTPATIENT
Start: 2025-04-17 | End: 2025-04-17

## 2025-04-17 RX ORDER — PRENATAL WITH FERROUS FUM AND FOLIC ACID 3080; 920; 120; 400; 22; 1.84; 3; 20; 10; 1; 12; 200; 27; 25; 2 [IU]/1; [IU]/1; MG/1; [IU]/1; MG/1; MG/1; MG/1; MG/1; MG/1; MG/1; UG/1; MG/1; MG/1; MG/1; MG/1
1 TABLET ORAL DAILY
Status: DISCONTINUED | OUTPATIENT
Start: 2025-04-17 | End: 2025-04-19 | Stop reason: HOSPADM

## 2025-04-17 RX ORDER — LIDOCAINE HYDROCHLORIDE AND EPINEPHRINE 15; 5 MG/ML; UG/ML
INJECTION, SOLUTION EPIDURAL
Status: DISCONTINUED | OUTPATIENT
Start: 2025-04-17 | End: 2025-04-17

## 2025-04-17 RX ORDER — ONDANSETRON 8 MG/1
8 TABLET, ORALLY DISINTEGRATING ORAL EVERY 8 HOURS PRN
Status: DISCONTINUED | OUTPATIENT
Start: 2025-04-17 | End: 2025-04-17 | Stop reason: SDUPTHER

## 2025-04-17 RX ORDER — ONDANSETRON 8 MG/1
8 TABLET, ORALLY DISINTEGRATING ORAL EVERY 8 HOURS PRN
Status: DISCONTINUED | OUTPATIENT
Start: 2025-04-17 | End: 2025-04-19 | Stop reason: HOSPADM

## 2025-04-17 RX ADMIN — ACETAMINOPHEN 650 MG: 325 TABLET ORAL at 05:04

## 2025-04-17 RX ADMIN — Medication 4 MILLI-UNITS/MIN: at 07:04

## 2025-04-17 RX ADMIN — IBUPROFEN 600 MG: 600 TABLET, FILM COATED ORAL at 05:04

## 2025-04-17 RX ADMIN — SODIUM CHLORIDE, POTASSIUM CHLORIDE, SODIUM LACTATE AND CALCIUM CHLORIDE: 600; 310; 30; 20 INJECTION, SOLUTION INTRAVENOUS at 07:04

## 2025-04-17 RX ADMIN — ROPIVACAINE HYDROCHLORIDE 6 ML: 2 INJECTION, SOLUTION EPIDURAL; INFILTRATION at 09:04

## 2025-04-17 RX ADMIN — DEXTROSE MONOHYDRATE 5 MILLION UNITS: 5 INJECTION INTRAVENOUS at 07:04

## 2025-04-17 RX ADMIN — LIDOCAINE HYDROCHLORIDE,EPINEPHRINE BITARTRATE 3 ML: 15; .005 INJECTION, SOLUTION EPIDURAL; INFILTRATION; INTRACAUDAL; PERINEURAL at 09:04

## 2025-04-17 RX ADMIN — DEXTROSE MONOHYDRATE 3 MILLION UNITS: 50 INJECTION, SOLUTION INTRAVENOUS at 11:04

## 2025-04-17 RX ADMIN — ACETAMINOPHEN 650 MG: 325 TABLET ORAL at 11:04

## 2025-04-17 RX ADMIN — SERTRALINE HYDROCHLORIDE 75 MG: 50 TABLET ORAL at 03:04

## 2025-04-17 RX ADMIN — IBUPROFEN 600 MG: 600 TABLET, FILM COATED ORAL at 11:04

## 2025-04-17 RX ADMIN — PRENATAL VITAMINS-IRON FUMARATE 27 MG IRON-FOLIC ACID 0.8 MG TABLET 1 TABLET: at 03:04

## 2025-04-17 NOTE — ANESTHESIA PROCEDURE NOTES
Epidural    Patient location during procedure: OB   Reason for block: primary anesthetic   Reason for block: labor analgesia requested by patient and obstetrician  Diagnosis: IUP   Start time: 4/17/2025 9:52 AM  Timeout: 4/17/2025 9:52 AM  End time: 4/17/2025 10:00 AM  Surgery related to: Planned vaginal delivery    Staffing  Performing Provider: Anderson Mcdonald CRNA  Authorizing Provider: Stefano Benavides MD    Staffing  Performed by: Anderson Mcdonald CRNA  Authorized by: Stefano Benavides MD        Preanesthetic Checklist  Completed: patient identified, IV checked, site marked, risks and benefits discussed, surgical consent, monitors and equipment checked, pre-op evaluation, timeout performed, anesthesia consent given, hand hygiene performed and patient being monitored  Preparation  Patient position: sitting  Prep: ChloraPrep  Patient monitoring: Pulse Ox and Blood Pressure  Reason for block: primary anesthetic   Epidural  Skin Anesthetic: lidocaine 1%  Skin Wheal: 3 mL  Administration type: continuous  Approach: midline  Interspace: L2-3    Injection technique: BABAR air  Needle and Epidural Catheter  Needle type: Tuohy   Needle gauge: 17  Needle length: 3.5 inches  Needle insertion depth: 6 cm  Catheter type: springwound and multi-orifice  Catheter size: 18 G  Catheter at skin depth: 12 cm  Insertion Attempts: 1  Test dose: 3 mL of lidocaine 1.5% with Epi 1-to-200,000  Additional Documentation: incremental injection, negative aspiration for heme and CSF, no paresthesia on injection, no signs/symptoms of IV or SA injection, no significant pain on injection and no significant complaints from patient  Needle localization: anatomical landmarks  Medications:  Volume per aspiration: 0 mL  Time between aspirations: 2 minutes   Assessment  Upper dermatomal levels - Left: T10  Right: T10   Dermatomal levels determined by pinch or prick  Ease of block: easy  Patient's tolerance of the procedure: comfortable  throughout block and no complaints No inadvertent dural puncture with Tuohy.  Dural puncture not performed with spinal needle

## 2025-04-17 NOTE — SUBJECTIVE & OBJECTIVE
Obstetric HPI:  This pregnancy has been complicated by   GBS bacteruria   Hx PP pre-e  anxiety    OB History    Para Term  AB Living   4 1 1 0 2 1   SAB IAB Ectopic Multiple Live Births   2 0 0 0 1      # Outcome Date GA Lbr Gato/2nd Weight Sex Type Anes PTL Lv   4 Current            3 2024           2 Term 22 39w6d  3.26 kg (7 lb 3 oz) M Vag-Spont EPI N MAEDLIN      Complications: Failure to Progress in Second Stage      Name: Colt To 2021             Past Medical History:   Diagnosis Date    ASCUS with positive high risk HPV cervical 2024     History reviewed. No pertinent surgical history.    PTA Medications   Medication Sig    aspirin (ECOTRIN) 81 MG EC tablet Take 81 mg by mouth once daily.    azelaic acid (AZELEX) 15 % gel Apply topically 2 (two) times daily.    ondansetron (ZOFRAN-ODT) 4 MG TbDL Take 1 tablet (4 mg total) by mouth every 6 (six) hours as needed (nausea).    prenatal vit no.124/iron/folic (PRENATAL VITAMIN ORAL) Take 1 tablet by mouth once daily.    sertraline (ZOLOFT) 50 MG tablet Take 1 tablet by mouth every morning. (Patient taking differently: Take 75 mg by mouth once daily.)       Review of patient's allergies indicates:  No Known Allergies     Family History    None       Tobacco Use    Smoking status: Never     Passive exposure: Never    Smokeless tobacco: Never   Substance and Sexual Activity    Alcohol use: No    Drug use: No    Sexual activity: Yes     Partners: Male     Review of Systems   Gastrointestinal:  Negative for abdominal pain.   Genitourinary:  Negative for vaginal bleeding and vaginal discharge.      Objective:     Vital Signs (Most Recent):    Vital Signs (24h Range):           There is no height or weight on file to calculate BMI.    FHT: 130 Cat 1 (reassuring)  TOCO:  irregular     Physical Exam:   Constitutional: She is oriented to person, place, and time. Vital signs are normal. She appears well-developed and well-nourished. She is  cooperative.    HENT:   Head: Normocephalic.      Cardiovascular:  Normal rate.             Pulmonary/Chest: Effort normal.        Abdominal: Soft.   Gravid, non-tender     Genitourinary:    Vagina and uterus normal.             Musculoskeletal: Normal range of motion and moves all extremeties.       Neurological: She is alert and oriented to person, place, and time. She has normal strength.    Skin: Skin is warm and dry. Capillary refill takes less than 2 seconds.    Psychiatric: She has a normal mood and affect. Her speech is normal and behavior is normal. Judgment and thought content normal.        Cervix:  Dilation:  4  Effacement:  80  Station: -2  Presentation: Vertex    Exam per RN     Significant Labs:  Lab Results   Component Value Date    GROUPTRH B POS 08/14/2024    HEPBSAG Non-reactive 08/14/2024       I have personallly reviewed all pertinent lab results from the last 24 hours.  Recent Lab Results         04/17/25  0741        Baso # 0.04       Basophil % 0.4       Eos # 0.08       Eos % 0.9       Gran # (ANC) 5.94       Hematocrit 33.6       Hemoglobin 11.4       Immature Grans (Abs) 0.05  Comment: Mild elevation in immature granulocytes is non specific and can be seen in a variety of conditions including stress response, acute inflammation, trauma and pregnancy. Correlation with other laboratory and clinical findings is essential.       Immature Granulocytes 0.6       Lymph # 2.25       Lymph % 24.8       MCH 29.7       MCHC 33.9       MCV 88       Mono # 0.73       Mono % 8.0       MPV 10.3       Neut % 65.3       nRBC 0       Platelet Count 258       RBC 3.84       RDW 12.5       WBC 9.09

## 2025-04-17 NOTE — HOSPITAL COURSE
4/17/25 0800  Admit for IOL  Pitocin per protocol  PCN for GBS +  Epidural upon request  Anticipate progress and NVD    4/18/25: PPD1, Routine PP Care  4/19/25 PPD2: routine PP discharge instructions reviewed.   Please notify if experiencing increased vaginal bleeding. Saturating a pad in 20 min or passage of clots the size of a baseball.    Notify if experiencing dizziness, headache, blurred vision. Please take blood pressure if able. If blood pressure is greater than 140/90 repeat blood pressure again in 15 min.  If still greater than 140/90 notify provider.   Notify provider for signs of postpartum depression such as feelings of being overwhelmed and uninterested in caring for self or baby   Continue zoloft

## 2025-04-17 NOTE — PROGRESS NOTES
S: comfortable s/p epidural  O:  VS reviewed, afebrile   Vitals:    04/17/25 1028 04/17/25 1033 04/17/25 1034 04/17/25 1038   BP:   123/74    Pulse: 70 76 82 83   Resp:       Temp:       TempSrc:       SpO2: 98% 97%  97%   Weight:       Height:           FHTs Cat I reassuring  UC q2.5 mins  SVE 5/80/-2 AROM moderate amount of clear fluid    A: IUP @ 40w4d ;     Problem List[1]    P:   Continue pitocin IOL  Continue close monitoring of maternal/fetal status  Anticipate progress and NVD         [1]   Patient Active Problem List  Diagnosis    Anxiety in pregnancy in third trimester, antepartum    GBS bacteriuria    ASCUS with positive high risk HPV cervical    Normal pregnancy in third trimester    History of postpartum pre-eclampsia    Post-term pregnancy, 40-42 weeks of gestation

## 2025-04-17 NOTE — ANESTHESIA PREPROCEDURE EVALUATION
2025  Yuliya Flores is a 30 y.o., female.      Pre-op Assessment    I have reviewed the Patient Summary Reports.     I have reviewed the Nursing Notes.    I have reviewed the Medications.     Review of Systems  Anesthesia Hx:   Neg history of prior surgery.          Denies Family Hx of Anesthesia complications.    Denies Personal Hx of Anesthesia complications.                    OB/GYN/PEDS:    Planned Vaginal Delivery                     Psych:   anxiety                 Physical Exam  General: Well nourished, Cooperative and Alert    Airway:  Mallampati: II   Mouth Opening: Normal  TM Distance: Normal  Tongue: Normal  Neck ROM: Normal ROM    Dental:  Intact    Chest/Lungs:  Clear to auscultation, Normal Respiratory Rate    Heart:  Rate: Normal  Rhythm: Regular Rhythm  Sounds: Normal      Lab Results   Component Value Date    WBC 9.09 2025    HGB 11.4 (L) 2025    HCT 33.6 (L) 2025    MCV 88 2025     2025       OB History          4    Para   1    Term   1            AB   2    Living   1         SAB   2    IAB        Ectopic        Multiple        Live Births   1                   Anesthesia Plan  Type of Anesthesia, risks & benefits discussed:    Anesthesia Type: Epidural  Intra-op Monitoring Plan: Standard ASA Monitors  Post Op Pain Control Plan: epidural analgesia  Informed Consent: Informed consent signed with the Patient and all parties understand the risks and agree with anesthesia plan.  All questions answered.   ASA Score: 2  Day of Surgery Review of History & Physical: H&P Update referred to the surgeon/provider.    Ready For Surgery From Anesthesia Perspective.     .

## 2025-04-17 NOTE — H&P
[facility] - Labor & Delivery  Obstetrics  History & Physical    Patient Name: Yuliya Jarvisoer  MRN: [id number]  Admission Date: 2025  Primary Care Provider: Jocelyn Mobley NP    Subjective:     Principal Problem:Post-term pregnancy, 40-42 weeks of gestation    History of Present Illness:  31yo  EGA 40w4d presents to LD for scheduled IOL for post dates. Denies regular contractions, VB or LOF. Reports good fetal movement.    Obstetric HPI:  This pregnancy has been complicated by   GBS bacteruria   Hx PP pre-e  anxiety    OB History    Para Term  AB Living   4 1 1 0 2 1   SAB IAB Ectopic Multiple Live Births   2 0 0 0 1      # Outcome Date GA Lbr Gato/2nd Weight Sex Type Anes PTL Lv   4 Current            3 2024           2 Term 22 39w6d  3.26 kg (7 lb 3 oz) M Vag-Spont EPI N MADELIN      Complications: Failure to Progress in Second Stage      Name: Colt   1 2021             Past Medical History:   Diagnosis Date    ASCUS with positive high risk HPV cervical 2024     History reviewed. No pertinent surgical history.    PTA Medications   Medication Sig    aspirin (ECOTRIN) 81 MG EC tablet Take 81 mg by mouth once daily.    azelaic acid (AZELEX) 15 % gel Apply topically 2 (two) times daily.    ondansetron (ZOFRAN-ODT) 4 MG TbDL Take 1 tablet (4 mg total) by mouth every 6 (six) hours as needed (nausea).    prenatal vit no.124/iron/folic (PRENATAL VITAMIN ORAL) Take 1 tablet by mouth once daily.    sertraline (ZOLOFT) 50 MG tablet Take 1 tablet by mouth every morning. (Patient taking differently: Take 75 mg by mouth once daily.)       Review of patient's allergies indicates:  No Known Allergies     Family History    None       Tobacco Use    Smoking status: Never     Passive exposure: Never    Smokeless tobacco: Never   Substance and Sexual Activity    Alcohol use: No    Drug use: No    Sexual activity: Yes     Partners: Male     Review of Systems   Gastrointestinal:  Negative  for abdominal pain.   Genitourinary:  Negative for vaginal bleeding and vaginal discharge.      Objective:     Vital Signs (Most Recent):    Vital Signs (24h Range):           There is no height or weight on file to calculate BMI.    FHT: 130 Cat 1 (reassuring)  TOCO:  irregular     Physical Exam:   Constitutional: She is oriented to person, place, and time. Vital signs are normal. She appears well-developed and well-nourished. She is cooperative.    HENT:   Head: Normocephalic.      Cardiovascular:  Normal rate.             Pulmonary/Chest: Effort normal.        Abdominal: Soft.   Gravid, non-tender     Genitourinary:    Vagina and uterus normal.             Musculoskeletal: Normal range of motion and moves all extremeties.       Neurological: She is alert and oriented to person, place, and time. She has normal strength.    Skin: Skin is warm and dry. Capillary refill takes less than 2 seconds.    Psychiatric: She has a normal mood and affect. Her speech is normal and behavior is normal. Judgment and thought content normal.        Cervix:  Dilation:  4  Effacement:  80  Station: -2  Presentation: Vertex    Exam per RN     Significant Labs:  Lab Results   Component Value Date    GROUPTRH B POS 08/14/2024    HEPBSAG Non-reactive 08/14/2024       I have personallly reviewed all pertinent lab results from the last 24 hours.  Recent Lab Results         04/17/25  0741        Baso # 0.04       Basophil % 0.4       Eos # 0.08       Eos % 0.9       Gran # (ANC) 5.94       Hematocrit 33.6       Hemoglobin 11.4       Immature Grans (Abs) 0.05  Comment: Mild elevation in immature granulocytes is non specific and can be seen in a variety of conditions including stress response, acute inflammation, trauma and pregnancy. Correlation with other laboratory and clinical findings is essential.       Immature Granulocytes 0.6       Lymph # 2.25       Lymph % 24.8       MCH 29.7       MCHC 33.9       MCV 88       Mono # 0.73       Mono  % 8.0       MPV 10.3       Neut % 65.3       nRBC 0       Platelet Count 258       RBC 3.84       RDW 12.5       WBC 9.09             Assessment/Plan:     30 y.o. female  at 40w4d for:    * Post-term pregnancy, 40-42 weeks of gestation  25 0800  Admit for IOL  Pitocin per protocol  Epidural upon request  EFW 37%tile at 35 weeks; EFW 7.5 lbs  Anticipate progress and NVD    History of postpartum pre-eclampsia  Monitor BP closely    GBS bacteriuria  PCN per protocol    Anxiety in pregnancy in third trimester, antepartum  Continue home dose of zoloft        Batsheva Gonzalez CNM  Obstetrics  [facility] - Labor & Delivery

## 2025-04-17 NOTE — HPI
29yo  EGA 40w4d presents to LD for scheduled IOL for post dates. Denies regular contractions, VB or LOF. Reports good fetal movement.

## 2025-04-17 NOTE — L&D DELIVERY NOTE
[facility] - Labor & Delivery  Vaginal Delivery   Operative Note    SUMMARY     Normal spontaneous vaginal delivery of live infant, was placed on mothers abdomen for skin to skin and bulb suctioning performed.  Infant delivered position OA over intact perineum.  Nuchal cord: No.    Spontaneous delivery of placenta and IV pitocin given noting good uterine tone. Prophylactic TXA given.   No lacerations noted.  Patient tolerated delivery well. Sponge needle and lap counted correctly x2.      Apgars 8/8    Indications: Post-term pregnancy, 40-42 weeks of gestation  Pregnancy complicated by: Problem List[1]  Admitting GA: 40w4d    Delivery Information for Girl Yuliya Consoer    Birth information:  YOB: 2023   Time of birth: 2:03 PM   Sex: female   Head Delivery Date/Time: 2025  2:03 PM   Delivery type: Vaginal, Spontaneous   Gestational Age: 40w4d       Delivery Providers    Delivering clinician: Batsheva Gonzalez CNM   Provider Role    Danielle Alvarez RN Registered Nurse    Ingrid Dela Cruz RN Registered Nurse    [facility]    [facility] HospitalMarisel RN Registered Nurse              Measurements    Weight:   Length:          Apgars    Living status: Living  Apgar Component Scores:  1 min.:  5 min.:  10 min.:  15 min.:  20 min.:    Skin color:         Heart rate:         Reflex irritability:         Muscle tone:         Respiratory effort:         Total:                  Operative Delivery    Forceps attempted?: No  Vacuum extractor attempted?: No         Shoulder Dystocia    Shoulder dystocia present?: No           Presentation    Presentation: Vertex           Interventions/Resuscitation    Method: Bulb Suctioning, Tactile Stimulation, Deep Suctioning, CPAP       Cord    Cord Clamped Date/Time: 2025  2:04 PM  Cord Blood Disposition: Discarded  Gases Sent?: No  Stem Cell Collection (by MD): No       Placenta    Placenta delivery date/time: 2025 14:08  Placenta  removal: Spontaneous  Placenta appearance: Intact  Placenta disposition: Discarded           Labor Events:       labor: No     Labor Onset Date/Time: 2025 07:00     Dilation Complete Date/Time: 2025 13:44     Start Pushing Date/Time: 2025 13:45       Start Pushing Date/Time: 2025 13:45     Rupture Date/Time: 25 1039        Rupture type: ARM (Artificial Rupture)        Fluid Amount:       Fluid Color: Clear              steroids: None     Antibiotics given for GBS: Yes     Induction: amniotomy;oxytocin     Indications for induction:  Post-term Gestation     Augmentation:       Indications for augmentation:       Labor complications: None     Additional complications:          Cervical ripening:                     Delivery:      Episiotomy: None     Indication for Episiotomy:       Perineal Lacerations: None Repaired:      Periurethral Laceration:   Repaired:     Labial Laceration:   Repaired:     Sulcus Laceration:   Repaired:     Vaginal Laceration:   Repaired:     Cervical Laceration:   Repaired:     Repair suture: None     Repair # of packets: 0     Last Value - EBL - Nursing (mL):       Sum - EBL - Nursing (mL): 0     Last Value - EBL - Anesthesia (mL):      Calculated QBL (mL): 150     Running total QBL (mL): 150     Vaginal Sweep Performed: No     Surgicount Correct: Yes     Vaginal Packing: No Quantity:       Other providers:       Anesthesia    Method: Epidural          Details (if applicable):  Trial of Labor      Categorization:      Priority:     Indications for :     Incision Type:       Additional  information:  Forceps:    Vacuum:    Breech:    Observed anomalies    Other (Comments):              [1]   Patient Active Problem List  Diagnosis    Anxiety in pregnancy in third trimester, antepartum    GBS bacteriuria    ASCUS with positive high risk HPV cervical    Normal pregnancy in third trimester    History of postpartum  pre-eclampsia    Post-term pregnancy, 40-42 weeks of gestation     (normal spontaneous vaginal delivery)    Single live birth

## 2025-04-17 NOTE — PROGRESS NOTES
S: comfortable with epidural, family at bedside supportive  O:  VS reviewed, afebrile   Vitals:    04/17/25 1158 04/17/25 1203 04/17/25 1204 04/17/25 1208   BP:   (!) 100/52    Pulse: (!) 53 61 66 65   Resp:       Temp:       TempSrc:       SpO2: 99% 98%  99%   Weight:       Height:           FHTs 115 Cat II, moderate variability, accels present, early and variable decelerations noted  UC q2-3 mins  SVE 6.5/80/-2    A: IUP @ 40w4d ;     Problem List[1]    P:   Pitocin halved to 4mu/min, IV fluid bolus  Position change  Continue close monitoring of maternal/fetal status  Anticipate progress and NVD         [1]   Patient Active Problem List  Diagnosis    Anxiety in pregnancy in third trimester, antepartum    GBS bacteriuria    ASCUS with positive high risk HPV cervical    Normal pregnancy in third trimester    History of postpartum pre-eclampsia    Post-term pregnancy, 40-42 weeks of gestation

## 2025-04-17 NOTE — ASSESSMENT & PLAN NOTE
4/17/25 0800  Admit for IOL  Pitocin per protocol  Epidural upon request  Anticipate progress and NVD

## 2025-04-18 PROCEDURE — 99024 POSTOP FOLLOW-UP VISIT: CPT | Mod: ,,, | Performed by: ADVANCED PRACTICE MIDWIFE

## 2025-04-18 PROCEDURE — 11000001 HC ACUTE MED/SURG PRIVATE ROOM

## 2025-04-18 PROCEDURE — 25000003 PHARM REV CODE 250: Performed by: ADVANCED PRACTICE MIDWIFE

## 2025-04-18 RX ADMIN — IBUPROFEN 600 MG: 600 TABLET, FILM COATED ORAL at 11:04

## 2025-04-18 RX ADMIN — IBUPROFEN 600 MG: 600 TABLET, FILM COATED ORAL at 06:04

## 2025-04-18 RX ADMIN — IBUPROFEN 600 MG: 600 TABLET, FILM COATED ORAL at 05:04

## 2025-04-18 RX ADMIN — ACETAMINOPHEN 650 MG: 325 TABLET ORAL at 11:04

## 2025-04-18 RX ADMIN — SERTRALINE HYDROCHLORIDE 75 MG: 50 TABLET ORAL at 08:04

## 2025-04-18 RX ADMIN — ACETAMINOPHEN 650 MG: 325 TABLET ORAL at 06:04

## 2025-04-18 RX ADMIN — ACETAMINOPHEN 650 MG: 325 TABLET ORAL at 05:04

## 2025-04-18 RX ADMIN — PRENATAL VITAMINS-IRON FUMARATE 27 MG IRON-FOLIC ACID 0.8 MG TABLET 1 TABLET: at 08:04

## 2025-04-18 NOTE — SUBJECTIVE & OBJECTIVE
Interval History:     She is doing well this morning. She is tolerating a regular diet without nausea or vomiting. She is voiding spontaneously. She is ambulating. Vaginal bleeding is mild. She denies fever or chills. Abdominal pain is mild and controlled with oral medications. She Is breastfeeding. She desires circumcision for her male baby: not applicable.    Objective:     Vital Signs (Most Recent):  Temp: 98.2 °F (36.8 °C) (04/18/25 1203)  Pulse: 67 (04/18/25 1203)  Resp: 17 (04/18/25 1203)  BP: (!) 124/59 (04/18/25 1203)  SpO2: 96 % (04/18/25 1203) Vital Signs (24h Range):  Temp:  [97.9 °F (36.6 °C)-98.2 °F (36.8 °C)] 98.2 °F (36.8 °C)  Pulse:  [67-91] 67  Resp:  [17-18] 17  SpO2:  [96 %-100 %] 96 %  BP: (124-135)/(59-75) 124/59     Weight: 47.1 kg (103 lb 13.4 oz)  Body mass index is 16.26 kg/m².      Intake/Output Summary (Last 24 hours) at 4/18/2025 1551  Last data filed at 4/17/2025 2215  Gross per 24 hour   Intake --   Output 1850 ml   Net -1850 ml         Significant Labs:  Lab Results   Component Value Date    GROUPTRH B POS 04/17/2025    HEPBSAG Non-reactive 08/14/2024     Recent Labs   Lab 04/17/25  0741   HGB 11.4*   HCT 33.6*       I have personallly reviewed all pertinent lab results from the last 24 hours.    Physical Exam:   Constitutional: She is oriented to person, place, and time. She appears well-developed and well-nourished.    HENT:   Head: Normocephalic.      Cardiovascular:  Normal rate.             Pulmonary/Chest: Effort normal.        Abdominal: Soft. There is no abdominal tenderness.             Musculoskeletal: Normal range of motion and moves all extremeties.       Neurological: She is alert and oriented to person, place, and time.    Skin: Skin is warm and dry.    Psychiatric: She has a normal mood and affect. Her behavior is normal. Judgment and thought content normal.       Review of Systems

## 2025-04-18 NOTE — LACTATION NOTE
Lactation Rounds:    Mother reports breastfeeding is going well. She denies questions or concerns at this time.     Discussed mechanism of milk production and maintenance. Encouraged frequent feeds based on early cues, unrestricted access to the breast and frequent skin to skin contact. Discussed expected feeding and output pattern for day of life 2. Reinforced normalcy and importance of cluster feeding.      Mother verbalizes understanding of all education and counseling. Mother denies any further lactation needs or concerns at this time. Discussed lactation availability. Encouraged mother to call for assistance when needs arise.

## 2025-04-18 NOTE — LACTATION NOTE
This note was copied from a baby's chart.  Lactation Rounds: Upon entering room, nurse notes that infant is in crib at mother's bedside sleeping. Mother reports that breast feeding went well without concerns at this time. Lactation packet reviewed for days 1-2.  Discussed early feeding cues and encouraged mother to feed baby in response to those cues. Encouraged on demand feedings and skin to skin.  Reviewed normal feeding expectations of 8 or more feedings per 24 hour period, cues that babies use to signal hunger and satiety and cluster feeding. Discussed the adequacy of colostrum and baby belly size for the first 3 days of life along with expected output.     Discussed risks of introducing a pacifier or artificial nipple and discussed the AAP recommendation to avoid the use of pacifiers until 1 month of age for breastfeeding infants. Mother states that she has obtained a breast pump via her insurance provider before delivery.     Mother states understanding and verbalized appropriate recall. Encouraged mother to call for assistance when desired or when infant is showing signs of hunger, contact number provided, mother verbalizes understanding.

## 2025-04-18 NOTE — ANESTHESIA POSTPROCEDURE EVALUATION
Anesthesia Post Evaluation    Patient: Yuliya Jarvisoer    Procedure(s) Performed: * No procedures listed *    Final Anesthesia Type: epidural      Patient location during evaluation: labor & delivery  Patient participation: Yes- Able to Participate  Level of consciousness: awake and alert, awake and oriented  Post-procedure vital signs: reviewed and stable  Pain management: adequate  Airway patency: patent    PONV status at discharge: No PONV  Anesthetic complications: no      Cardiovascular status: blood pressure returned to baseline and hemodynamically stable  Respiratory status: unassisted and spontaneous ventilation  Hydration status: euvolemic  Follow-up not needed.              Vitals Value Taken Time   /75 04/17/25 16:19   Temp 36.7 °C (98 °F) 04/17/25 16:20   Pulse 72 04/17/25 16:20   Resp 18 04/17/25 08:00   SpO2 99 % 04/17/25 16:20         No case tracking events are documented in the log.      Pain/Polina Score: Pain Rating Prior to Med Admin: 0 (4/17/2025 10:09 PM)  Polina Score: 10 (4/17/2025  7:05 PM)

## 2025-04-18 NOTE — PLAN OF CARE
Plan of care discussed with patient. Pt had no falls this shift. Fundus firm without massage and below umbilicus. Bleeding light, no clots passed this shift. Voids spontaneously. Ambulates independently. Pain well controlled with oral pain medication. Vital signs stable at this time. Bonding well with infant; responds to infant cues and participates in infant care.Call light in reach of patient. Educated to use if needed.

## 2025-04-18 NOTE — PROGRESS NOTES
[facility] ([facility])  Obstetrics  Postpartum Progress Note    Patient Name: Yuliya Flores  MRN: [id number]  Admission Date: 4/17/2025  Hospital Length of Stay: 1 days  Attending Physician: Wendy Schmidt,*  Primary Care Provider: Jocelyn Mobley NP    Subjective:     Principal Problem:<principal problem not specified>    Hospital Course:  4/17/25 0800  Admit for IOL  Pitocin per protocol  PCN for GBS +  Epidural upon request  Anticipate progress and NVD    4/18/25: PPD1, Routine PP Care    Interval History:     She is doing well this morning. She is tolerating a regular diet without nausea or vomiting. She is voiding spontaneously. She is ambulating. Vaginal bleeding is mild. She denies fever or chills. Abdominal pain is mild and controlled with oral medications. She Is breastfeeding. She desires circumcision for her male baby: not applicable.    Objective:     Vital Signs (Most Recent):  Temp: 98.2 °F (36.8 °C) (04/18/25 1203)  Pulse: 67 (04/18/25 1203)  Resp: 17 (04/18/25 1203)  BP: (!) 124/59 (04/18/25 1203)  SpO2: 96 % (04/18/25 1203) Vital Signs (24h Range):  Temp:  [97.9 °F (36.6 °C)-98.2 °F (36.8 °C)] 98.2 °F (36.8 °C)  Pulse:  [67-91] 67  Resp:  [17-18] 17  SpO2:  [96 %-100 %] 96 %  BP: (124-135)/(59-75) 124/59     Weight: 47.1 kg (103 lb 13.4 oz)  Body mass index is 16.26 kg/m².      Intake/Output Summary (Last 24 hours) at 4/18/2025 1551  Last data filed at 4/17/2025 2215  Gross per 24 hour   Intake --   Output 1850 ml   Net -1850 ml         Significant Labs:  Lab Results   Component Value Date    GROUPTRH B POS 04/17/2025    HEPBSAG Non-reactive 08/14/2024     Recent Labs   Lab 04/17/25  0741   HGB 11.4*   HCT 33.6*       I have personallly reviewed all pertinent lab results from the last 24 hours.    Physical Exam:   Constitutional: She is oriented to person, place, and time. She appears well-developed and well-nourished.    HENT:   Head: Normocephalic.      Cardiovascular:   Normal rate.             Pulmonary/Chest: Effort normal.        Abdominal: Soft. There is no abdominal tenderness.             Musculoskeletal: Normal range of motion and moves all extremeties.       Neurological: She is alert and oriented to person, place, and time.    Skin: Skin is warm and dry.    Psychiatric: She has a normal mood and affect. Her behavior is normal. Judgment and thought content normal.       Review of Systems  Assessment/Plan:     30 y.o. female  for:    Single live birth  Care per peds     (normal spontaneous vaginal delivery)  Routine PP Care  Breastfeeding female infant    Post-term pregnancy, 40-42 weeks of gestation  25 0800  Admit for IOL  Pitocin per protocol  Epidural upon request  Anticipate progress and NVD    History of postpartum pre-eclampsia  BP stable, will continue to monitor    GBS bacteriuria  PCN per protocol    Anxiety in pregnancy in third trimester, antepartum  Continue home dose of zoloft        Disposition: As patient meets milestones, will plan to discharge tomorrow.    Anil Andre, ISELA  Obstetrics  [facility] ([facility])

## 2025-04-19 ENCOUNTER — LACTATION ENCOUNTER (OUTPATIENT)
Dept: OBSTETRICS AND GYNECOLOGY | Facility: HOSPITAL | Age: 30
End: 2025-04-19

## 2025-04-19 VITALS
HEART RATE: 74 BPM | OXYGEN SATURATION: 97 % | RESPIRATION RATE: 16 BRPM | DIASTOLIC BLOOD PRESSURE: 66 MMHG | SYSTOLIC BLOOD PRESSURE: 123 MMHG | HEIGHT: 67 IN | BODY MASS INDEX: 16.29 KG/M2 | TEMPERATURE: 98 F | WEIGHT: 103.81 LBS

## 2025-04-19 PROCEDURE — 25000003 PHARM REV CODE 250: Performed by: ADVANCED PRACTICE MIDWIFE

## 2025-04-19 RX ORDER — IBUPROFEN 600 MG/1
600 TABLET ORAL EVERY 6 HOURS
Qty: 60 TABLET | Refills: 0 | Status: SHIPPED | OUTPATIENT
Start: 2025-04-19

## 2025-04-19 RX ORDER — DOCUSATE SODIUM 100 MG/1
200 CAPSULE, LIQUID FILLED ORAL 2 TIMES DAILY PRN
Qty: 60 CAPSULE | Refills: 0 | Status: SHIPPED | OUTPATIENT
Start: 2025-04-19

## 2025-04-19 RX ORDER — ACETAMINOPHEN 325 MG/1
650 TABLET ORAL EVERY 6 HOURS
Qty: 60 TABLET | Refills: 0 | Status: SHIPPED | OUTPATIENT
Start: 2025-04-19

## 2025-04-19 RX ADMIN — IBUPROFEN 600 MG: 600 TABLET, FILM COATED ORAL at 05:04

## 2025-04-19 RX ADMIN — PRENATAL VITAMINS-IRON FUMARATE 27 MG IRON-FOLIC ACID 0.8 MG TABLET 1 TABLET: at 08:04

## 2025-04-19 RX ADMIN — ACETAMINOPHEN 650 MG: 325 TABLET ORAL at 05:04

## 2025-04-19 RX ADMIN — HYDROCORTISONE: 25 CREAM TOPICAL at 11:04

## 2025-04-19 RX ADMIN — IBUPROFEN 600 MG: 600 TABLET, FILM COATED ORAL at 11:04

## 2025-04-19 RX ADMIN — ACETAMINOPHEN 650 MG: 325 TABLET ORAL at 11:04

## 2025-04-19 NOTE — LACTATION NOTE
This note was copied from a baby's chart.  Lactation Rounding: infant feeding frequency and output WNL. Infant weight loss noted as -6%    Mother anticipates discharge home today. Reviewed signs of good attachment. Reviewed breast massage and compression during feedings and indications for use. Reviewed signs of effective milk transfer and instructed to call pediatrician and lactation if signs not present. Discussed expected feeding and output pattern for days of life 2, 3, 4, & 5+; mother instructed to call pediatrician and lactation if infant is not meeting feeding and output goals.     Reviewed signs of engorgement and expectant management. Reviewed signs of mastitis and instructed mother to call OB provider and lactation if any signs present. Discussed proper use of First Alert Form. Reviewed proper milk handling, collection and storage guidelines. Reviewed nursing diet and nutrition. Discussed resources for medication safety while breastfeeding. Reviewed available outpatient lactation resources.     Mother verbalizes understanding of all education and counseling; she denies any further lactation needs or concerns at this time. Encouraged mother to contact lactation with any questions, concerns, or problems, contact number provided.

## 2025-04-19 NOTE — DISCHARGE SUMMARY
[facility] ([facility])  Obstetrics  Discharge Summary      Patient Name: Yuliya Flores  MRN: [id number]  Admission Date: 2025  Hospital Length of Stay: 2 days  Discharge Date and Time: 2025 8:07 AM  Attending Physician: Wendy Schmidt,*   Discharging Provider: Julia Jerome CNM   Primary Care Provider: Jocelyn Mobley NP    HPI: 29yo  EGA 40w4d presents to LD for scheduled IOL for post dates. Denies regular contractions, VB or LOF. Reports good fetal movement.        * No surgery found *     Hospital Course:   25 0800  Admit for IOL  Pitocin per protocol  PCN for GBS +  Epidural upon request  Anticipate progress and NVD    25: PPD1, Routine PP Care  25 PPD2: routine PP discharge instructions reviewed.   Please notify if experiencing increased vaginal bleeding. Saturating a pad in 20 min or passage of clots the size of a baseball.    Notify if experiencing dizziness, headache, blurred vision. Please take blood pressure if able. If blood pressure is greater than 140/90 repeat blood pressure again in 15 min.  If still greater than 140/90 notify provider.   Notify provider for signs of postpartum depression such as feelings of being overwhelmed and uninterested in caring for self or baby   Continue zoloft         Final Active Diagnoses:    Diagnosis Date Noted POA    PRINCIPAL PROBLEM:   (normal spontaneous vaginal delivery) [O80] 2025 Not Applicable    Single live birth [Z37.0] 2025 Not Applicable    History of postpartum pre-eclampsia [Z86.79, Z87.59] 2024 Not Applicable    GBS bacteriuria [R82.71] 2024 Yes    Anxiety in pregnancy in third trimester, antepartum [O99.343, F41.9] 2024 Yes      Problems Resolved During this Admission:        Significant Diagnostic Studies: Labs: All labs within the past 24 hours have been reviewed      Feeding Method: breast    Immunizations       Date Immunization Status Dose Route/Site Given by  "   25 1524 MMR Incomplete 0.5 mL Subcutaneous/     25 1524 Tdap Incomplete 0.5 mL Intramuscular/             Delivery:    Episiotomy: None   Lacerations: None   Repair suture: None   Repair # of packets: 0   Blood loss (ml):       Birth information:  YOB: 2023   Time of birth: 2:03 PM   Sex: female   Delivery type: Vaginal, Spontaneous   Gestational Age: 40w4d     Measurements    Weight: 3790 g  Weight (lbs): 8 lb 5.7 oz  Length: 51.5 cm  Length (in): 20.28"  Head circumference: 35 cm  Chest circumference: 35 cm  Abdominal girth: 34.5 cm         Delivery Clinician: Delivery Providers    Delivering clinician: Batsheva Gonzalez CNM   Provider Role    Danielle Alvarez, RN Registered Nurse    Ingrid Ya, RN Registered Nurse    [facility]    Marisel Espana RN Registered Nurse             Additional  information:  Forceps:    Vacuum:    Breech:    Observed anomalies      Living?:     Apgars    Living status: Living  Apgar Component Scores:  1 min.:  5 min.:  10 min.:  15 min.:  20 min.:    Skin color:  0  0       Heart rate:  2  2       Reflex irritability:  2  2       Muscle tone:  2  2       Respiratory effort:  2  2       Total:  8  8       Apgars assigned by: INGRID YA         Placenta: Delivered:       appearance  Pending Diagnostic Studies:       None            Discharged Condition: good    Disposition: Home or Self Care    Follow Up:    Patient Instructions:      Pelvic Rest     Notify your health care provider if you experience any of the following:  temperature >100.4     Notify your health care provider if you experience any of the following:  persistent nausea and vomiting or diarrhea     Notify your health care provider if you experience any of the following:  severe uncontrolled pain     Notify your health care provider if you experience any of the following:  difficulty breathing or increased cough     Notify your health care provider if " you experience any of the following:  severe persistent headache     Notify your health care provider if you experience any of the following:  persistent dizziness, light-headedness, or visual disturbances     Notify your health care provider if you experience any of the following:  increased confusion or weakness     Notify your health care provider if you experience any of the following:   Order Comments: Please notify if experiencing increased vaginal bleeding. Saturating a pad in 20 min or passage of clots the size of a baseball.    Notify if experiencing dizziness, headache, blurred vision. Please take blood pressure if able. If blood pressure is greater than 140/90 repeat blood pressure again in 15 min.  If still greater than 140/90 notify provider.   Notify provider for signs of postpartum depression such as feelings of being overwhelmed and uninterested in caring for self or baby     Activity as tolerated     Medications:  Current Discharge Medication List        START taking these medications    Details   acetaminophen (TYLENOL) 325 MG tablet Take 2 tablets (650 mg total) by mouth every 6 (six) hours.  Qty: 60 tablet, Refills: 0      docusate sodium (COLACE) 100 MG capsule Take 2 capsules (200 mg total) by mouth 2 (two) times daily as needed for Constipation.  Qty: 60 capsule, Refills: 0      ibuprofen (ADVIL,MOTRIN) 600 MG tablet Take 1 tablet (600 mg total) by mouth every 6 (six) hours.  Qty: 60 tablet, Refills: 0           CONTINUE these medications which have NOT CHANGED    Details   aspirin (ECOTRIN) 81 MG EC tablet Take 81 mg by mouth once daily.      azelaic acid (AZELEX) 15 % gel Apply topically 2 (two) times daily.      ondansetron (ZOFRAN-ODT) 4 MG TbDL Take 1 tablet (4 mg total) by mouth every 6 (six) hours as needed (nausea).  Qty: 25 tablet, Refills: 1    Associated Diagnoses: Nausea      prenatal vit no.124/iron/folic (PRENATAL VITAMIN ORAL) Take 1 tablet by mouth once daily.      sertraline  (ZOLOFT) 50 MG tablet Take 1 tablet by mouth every morning.             Julia Jerome CNM  Obstetrics  [facility] ([facility])

## 2025-04-19 NOTE — PLAN OF CARE
Plan of care dicussed with patient. Patient had no falls this shift. Fundus firm without massage and below umbilicus. Bleeding light, no clots passed this shift. Voids spontaneously. Ambulates independently. Pain well controlled with oral pain medication. Vital signs stable at this time. Bonding well with infant; responds to infant cues and participates in infant care. Call light in reach of patient. Educated to use if needed.

## 2025-04-19 NOTE — LACTATION NOTE
Lactation Rounds:   Mother is sitting up in bed with infant. She states that breastfeeding is going well. Mother just finished breastfeeding and reports hearing swallows and denies pain.     Reviewed the expected feeding patterns and importance of cluster feeding to promote milk production. Reviewed that infant should be fed on demand, at least 8 times a day. Reviewed proper positioning, signs of good attachment, and effective milk transfer. Educated that nipples should not appear misshapen or discolored after unlatching infant. Stressed the significance of frequent and uninterrupted skin-to-skin contact with the infant.     Mother denies any further lactation needs or concerns at this time. Lactation availability discussed. Encouraged mother to call for assistance when desired or when infant is showing signs of hunger. Mother verbalizes understanding of all education and counseling.

## 2025-04-19 NOTE — SUBJECTIVE & OBJECTIVE
"Interval History:     She is doing well this morning. She is tolerating a regular diet without nausea or vomiting. She is voiding spontaneously. She is ambulating. She has passed flatus, and has not a BM. Vaginal bleeding is mild. She denies fever or chills. Abdominal pain is mild and controlled with oral medications. She Is breastfeeding. She desires circumcision for her male baby: not applicable.    Objective:     Vital Signs (Most Recent):  Temp: 98.2 °F (36.8 °C) (04/19/25 0413)  Pulse: 74 (04/19/25 0413)  Resp: 18 (04/19/25 0413)  BP: 109/64 (04/19/25 0413)  SpO2: 97 % (04/18/25 1621) Vital Signs (24h Range):  Temp:  [97.9 °F (36.6 °C)-98.2 °F (36.8 °C)] 98.2 °F (36.8 °C)  Pulse:  [67-76] 74  Resp:  [17-18] 18  SpO2:  [96 %-97 %] 97 %  BP: (109-125)/(59-72) 109/64     Weight: 47.1 kg (103 lb 13.4 oz)  Body mass index is 16.26 kg/m².    No intake or output data in the 24 hours ending 04/19/25 0803      Significant Labs:  Lab Results   Component Value Date    GROUPH B POS 04/17/2025    HEPBSAG Non-reactive 08/14/2024     No results for input(s): "HGB", "HCT" in the last 48 hours.    I have personallly reviewed all pertinent lab results from the last 24 hours.    Physical Exam:   Constitutional: She is oriented to person, place, and time. She appears well-developed and well-nourished.       Cardiovascular:  Normal rate.             Pulmonary/Chest: Effort normal. No respiratory distress.        Abdominal: Soft.     Genitourinary:    Uterus normal.             Musculoskeletal: Moves all extremeties.       Neurological: She is alert and oriented to person, place, and time.    Skin: Skin is warm and dry.    Psychiatric: She has a normal mood and affect.       Review of Systems  "

## 2025-04-19 NOTE — DISCHARGE INSTRUCTIONS
"Mother Self Care:    Activity: Avoid strenuous exercise and get adequate rest.  No driving until the physician consent given.  Emotional Changes: Most women find birth to be a time of great emotional upheaval.  Sense of loss, mood swings, fatigue, anxiety, and feeling "let down" are common.  If feelings worsen or last more than a week, call your physician.  Breast Care/Breastfeeding: Wear a bra for comfort.  Keep nipples dry and apply your own breast milk or lanolin cream as needed for soreness.  Engorgement can be relieved with warm, moist heat before feedings.  You may also take Ibuprofen.  Radha-Care/Vaginal Bleeding: Remember to use your radha-bottle after urinating.  Your flow will change from red, to pink, to yellow/white color over a period of 2 weeks.  Menstruation will return in 3-8 weeks, or longer if breastfeeding.  Sexual Activity/Pelvic Rest: No sexual activity, tampons, or douching until your physician gives you consent.  Diet: Continue to eat from the five basic food groups, including plenty of protein, fruits, vegetables, and whole grains.  Limit empty calories and high fat foods.  Drink enough fluids to satisfy thirst and add an extra 500 calories for breastfeeding.  Constipation/Hemorrhoids: Drink plenty of water.  You may take a stool softener or natural laxative (Metamucil). You may use tucks or hemorrhoid ointment and soak in a warm tub.    "What does help look like to you when you go home?"  "Is there any need that you anticipate that we may be able to assist with?"    CALL YOUR OB DOCTOR IF ANY OF THE FOLLOWING OCCURS:  *Heavy bleeding - saturating a pad an hour or passing any large (2-3 inches in size) blood clots.  *Any pain, redness, or tenderness in lower leg.  *You cannot care for yourself or your baby.  *Any signs of infection-      - Temperature greater than 100.5 degrees F      - Foul smelling vaginal discharge and/or incisional drainage      - Increased episiotomy or incisional pain     "  - Hot, hard, red or sore area on breast      - Flu-like symptoms      - Any urgency, frequency or burning with urination    Return To the Hospital for further Evaluation:  Headache not relieved by tylenol or ibuprofen  Blurry vision, double vision, seeing spots, or flashing lights  Feeling faint or passing out  Right epigastric pain  Difficulty breathing  Swelling in hands, face, or feet  Any of these symptoms accompanied by nausea/vomiting  Gaining more than 5 pounds in one week  Seizures  These symptoms could be an indication of elevated blood pressure.     For patients that were treated for high blood pressure during pregnancy and or your hospital stay you will need a blood pressure check three days after you leave the hospital. Your nursing staff will assist you in an appointment if needed. If you have Connected Mom you may use your blood pressure cuff and report any readings 140/90 to your provider immediately.       If you have any questions that need to be answered immediately please call the Labor & Delivery Unit at [phone] and ask to speak to a nurse.      Please see [facility] folder for additional information and handouts.

## 2025-04-19 NOTE — PROGRESS NOTES
[facility] ([facility])  Obstetrics  Postpartum Progress Note    Patient Name: Yuliya Flores  MRN: [id number]  Admission Date: 4/17/2025  Hospital Length of Stay: 2 days  Attending Physician: Wendy Schmidt,*  Primary Care Provider: Jocelyn Mobley NP    Subjective:     Principal Problem:<principal problem not specified>    Hospital Course:  4/17/25 0800  Admit for IOL  Pitocin per protocol  PCN for GBS +  Epidural upon request  Anticipate progress and NVD    4/18/25: PPD1, Routine PP Care  4/19/25 PPD2: routine PP discharge instructions reviewed.   Please notify if experiencing increased vaginal bleeding. Saturating a pad in 20 min or passage of clots the size of a baseball.    Notify if experiencing dizziness, headache, blurred vision. Please take blood pressure if able. If blood pressure is greater than 140/90 repeat blood pressure again in 15 min.  If still greater than 140/90 notify provider.   Notify provider for signs of postpartum depression such as feelings of being overwhelmed and uninterested in caring for self or baby   Continue zoloft    Interval History:     She is doing well this morning. She is tolerating a regular diet without nausea or vomiting. She is voiding spontaneously. She is ambulating. She has passed flatus, and has not a BM. Vaginal bleeding is mild. She denies fever or chills. Abdominal pain is mild and controlled with oral medications. She Is breastfeeding. She desires circumcision for her male baby: not applicable.    Objective:     Vital Signs (Most Recent):  Temp: 98.2 °F (36.8 °C) (04/19/25 0413)  Pulse: 74 (04/19/25 0413)  Resp: 18 (04/19/25 0413)  BP: 109/64 (04/19/25 0413)  SpO2: 97 % (04/18/25 1621) Vital Signs (24h Range):  Temp:  [97.9 °F (36.6 °C)-98.2 °F (36.8 °C)] 98.2 °F (36.8 °C)  Pulse:  [67-76] 74  Resp:  [17-18] 18  SpO2:  [96 %-97 %] 97 %  BP: (109-125)/(59-72) 109/64     Weight: 47.1 kg (103 lb 13.4 oz)  Body mass index is 16.26 kg/m².    No  "intake or output data in the 24 hours ending 25 0803      Significant Labs:  Lab Results   Component Value Date    GROUPTRH B POS 2025    HEPBSAG Non-reactive 2024     No results for input(s): "HGB", "HCT" in the last 48 hours.    I have personallly reviewed all pertinent lab results from the last 24 hours.    Physical Exam:   Constitutional: She is oriented to person, place, and time. She appears well-developed and well-nourished.       Cardiovascular:  Normal rate.             Pulmonary/Chest: Effort normal. No respiratory distress.        Abdominal: Soft.     Genitourinary:    Uterus normal.             Musculoskeletal: Moves all extremeties.       Neurological: She is alert and oriented to person, place, and time.    Skin: Skin is warm and dry.    Psychiatric: She has a normal mood and affect.       Review of Systems  Assessment/Plan:     30 y.o. female  for:    Single live birth  Care per peds     (normal spontaneous vaginal delivery)  Routine PP Care  Breastfeeding female infant    Post-term pregnancy, 40-42 weeks of gestation  25 0800  Admit for IOL  Pitocin per protocol  Epidural upon request  Anticipate progress and NVD    History of postpartum pre-eclampsia  BP stable, will continue to monitor    GBS bacteriuria  PCN per protocol    Anxiety in pregnancy in third trimester, antepartum  Continue home dose of zoloft        Disposition: As patient meets milestones, will plan to discharge today.    Julia Jerome CNM  Obstetrics  [facility] ([facility])      "

## 2025-04-21 ENCOUNTER — PATIENT MESSAGE (OUTPATIENT)
Dept: OBSTETRICS AND GYNECOLOGY | Facility: CLINIC | Age: 30
End: 2025-04-21
Payer: COMMERCIAL

## 2025-04-21 ENCOUNTER — TELEPHONE (OUTPATIENT)
Dept: OBSTETRICS AND GYNECOLOGY | Facility: CLINIC | Age: 30
End: 2025-04-21
Payer: COMMERCIAL

## 2025-04-21 DIAGNOSIS — N63.20 MASS OF LEFT BREAST, UNSPECIFIED QUADRANT: Primary | ICD-10-CM

## 2025-04-21 NOTE — TELEPHONE ENCOUNTER
----- Message from Bety Woodward sent at 4/21/2025 12:59 PM CDT -----  I placed and US order for the Left breast for a Left breast lump. Can we please get this pt scheduled for this? Thank you :)

## 2025-04-22 ENCOUNTER — HOSPITAL ENCOUNTER (OUTPATIENT)
Dept: RADIOLOGY | Facility: HOSPITAL | Age: 30
Discharge: HOME | End: 2025-04-22
Payer: COMMERCIAL

## 2025-04-22 ENCOUNTER — RESULTS FOLLOW-UP (OUTPATIENT)
Dept: OBSTETRICS AND GYNECOLOGY | Facility: CLINIC | Age: 30
End: 2025-04-22

## 2025-04-22 DIAGNOSIS — N63.20 MASS OF LEFT BREAST, UNSPECIFIED QUADRANT: ICD-10-CM

## 2025-04-22 PROCEDURE — 76642 ULTRASOUND BREAST LIMITED: CPT | Mod: 26,LT,, | Performed by: RADIOLOGY

## 2025-04-22 PROCEDURE — 76642 ULTRASOUND BREAST LIMITED: CPT | Mod: TC,LT

## 2025-04-24 ENCOUNTER — PATIENT MESSAGE (OUTPATIENT)
Dept: OBSTETRICS AND GYNECOLOGY | Facility: CLINIC | Age: 30
End: 2025-04-24
Payer: COMMERCIAL

## 2025-05-29 ENCOUNTER — POSTPARTUM VISIT (OUTPATIENT)
Dept: OBSTETRICS AND GYNECOLOGY | Facility: CLINIC | Age: 30
End: 2025-05-29
Payer: COMMERCIAL

## 2025-05-29 VITALS
SYSTOLIC BLOOD PRESSURE: 112 MMHG | BODY MASS INDEX: 23.08 KG/M2 | DIASTOLIC BLOOD PRESSURE: 78 MMHG | HEIGHT: 67 IN | WEIGHT: 147.06 LBS

## 2025-05-29 DIAGNOSIS — Z30.014 ENCOUNTER FOR INITIAL PRESCRIPTION OF INTRAUTERINE CONTRACEPTIVE DEVICE (IUD): ICD-10-CM

## 2025-05-29 PROBLEM — O48.0 POST-TERM PREGNANCY, 40-42 WEEKS OF GESTATION: Status: RESOLVED | Noted: 2025-04-17 | Resolved: 2025-05-29

## 2025-05-29 PROBLEM — F41.9 ANXIETY IN PREGNANCY IN THIRD TRIMESTER, ANTEPARTUM: Status: RESOLVED | Noted: 2024-01-22 | Resolved: 2025-05-29

## 2025-05-29 PROBLEM — Z34.93 NORMAL PREGNANCY IN THIRD TRIMESTER: Status: RESOLVED | Noted: 2024-10-22 | Resolved: 2025-05-29

## 2025-05-29 PROBLEM — O99.343 ANXIETY IN PREGNANCY IN THIRD TRIMESTER, ANTEPARTUM: Status: RESOLVED | Noted: 2024-01-22 | Resolved: 2025-05-29

## 2025-05-29 PROBLEM — R82.71 GBS BACTERIURIA: Status: RESOLVED | Noted: 2024-08-16 | Resolved: 2025-05-29

## 2025-05-29 PROCEDURE — 99999 PR PBB SHADOW E&M-EST. PATIENT-LVL III: CPT | Mod: PBBFAC,,,

## 2025-05-29 PROCEDURE — 0503F POSTPARTUM CARE VISIT: CPT | Mod: CPTII,S$GLB,,

## 2025-05-29 RX ORDER — CLINDAMYCIN PHOSPHATE 10 MG/G
GEL TOPICAL 2 TIMES DAILY
COMMUNITY
Start: 2025-05-01

## 2025-05-29 RX ORDER — SERTRALINE HYDROCHLORIDE 100 MG/1
100 TABLET, FILM COATED ORAL EVERY MORNING
COMMUNITY
Start: 2025-04-28

## 2025-05-29 RX ORDER — CEPHALEXIN 500 MG/1
500 CAPSULE ORAL EVERY 6 HOURS
COMMUNITY
Start: 2025-05-25

## 2025-05-29 RX ORDER — HYDROCORTISONE 25 MG/G
CREAM TOPICAL 2 TIMES DAILY
COMMUNITY
Start: 2025-05-01

## 2025-05-29 NOTE — PROGRESS NOTES
"Subjective:       Yuliya Flores is a 30 y.o. female who presents for a postpartum visit. She is 4 weeks postpartum following a spontaneous vaginal delivery. I have fully reviewed the prenatal and intrapartum course. The delivery was at 40w4d gestational weeks. Outcome: spontaneous vaginal delivery. Anesthesia: epidural. Postpartum course has been WNL. Baby's course has been WNL. Baby is feeding by breast. Bleeding no bleeding. Bowel function is normal. Bladder function is normal. Patient is not sexually active. Contraception method is  Plans Copper IUD- Ordered. Postpartum depression screening: negative.    The following portions of the patient's history were reviewed and updated as appropriate: allergies, current medications, past family history, past medical history, past social history, past surgical history, and problem list.    Review of Systems  A comprehensive review of systems was negative.     Objective:      Ht 5' 7" (1.702 m)   LMP 06/15/2024 (Exact Date)   BMI 16.26 kg/m²    General:  alert, appears stated age, and cooperative          Assessment:      WNL postpartum exam. Pap smear not done at today's visit.     Plan:      1. Contraception: paragaurd  2. Colpo scheduled with NP and will need follow up after for IUD insertion. Device authorization ordered  3. Follow up in: 1 year or as needed.     "

## 2025-06-19 ENCOUNTER — PATIENT MESSAGE (OUTPATIENT)
Dept: OBSTETRICS AND GYNECOLOGY | Facility: CLINIC | Age: 30
End: 2025-06-19
Payer: COMMERCIAL

## 2025-06-22 DIAGNOSIS — N61.0 MASTITIS: Primary | ICD-10-CM

## 2025-06-22 RX ORDER — DICLOXACILLIN SODIUM 500 MG/1
500 CAPSULE ORAL EVERY 6 HOURS
Qty: 40 CAPSULE | Refills: 0 | Status: SHIPPED | OUTPATIENT
Start: 2025-06-22 | End: 2025-07-02

## 2025-06-23 ENCOUNTER — PROCEDURE VISIT (OUTPATIENT)
Dept: OBSTETRICS AND GYNECOLOGY | Facility: CLINIC | Age: 30
End: 2025-06-23
Payer: COMMERCIAL

## 2025-06-23 VITALS — SYSTOLIC BLOOD PRESSURE: 118 MMHG | BODY MASS INDEX: 23.2 KG/M2 | WEIGHT: 148.13 LBS | DIASTOLIC BLOOD PRESSURE: 68 MMHG

## 2025-06-23 DIAGNOSIS — Z01.818 PREPROCEDURAL EXAMINATION: ICD-10-CM

## 2025-06-23 DIAGNOSIS — R87.810 ATYPICAL SQUAMOUS CELL CHANGES OF UNDETERMINED SIGNIFICANCE (ASCUS) ON CERVICAL CYTOLOGY WITH POSITIVE HIGH RISK HUMAN PAPILLOMA VIRUS (HPV): Primary | ICD-10-CM

## 2025-06-23 DIAGNOSIS — R87.610 ATYPICAL SQUAMOUS CELL CHANGES OF UNDETERMINED SIGNIFICANCE (ASCUS) ON CERVICAL CYTOLOGY WITH POSITIVE HIGH RISK HUMAN PAPILLOMA VIRUS (HPV): Primary | ICD-10-CM

## 2025-06-23 LAB
B-HCG UR QL: NEGATIVE
CTP QC/QA: YES

## 2025-06-23 PROCEDURE — 57454 BX/CURETT OF CERVIX W/SCOPE: CPT | Mod: S$GLB,,, | Performed by: NURSE PRACTITIONER

## 2025-06-23 PROCEDURE — 88305 TISSUE EXAM BY PATHOLOGIST: CPT | Mod: TC | Performed by: NURSE PRACTITIONER

## 2025-06-23 PROCEDURE — 81025 URINE PREGNANCY TEST: CPT | Mod: S$GLB,,, | Performed by: NURSE PRACTITIONER

## 2025-06-23 NOTE — PROCEDURES
Colposcopy W/BIOPSY AND ECC- Today    Date/Time: 6/23/2025 10:15 AM    Performed by: Margo Segundo NP  Authorized by: Margo Segundo NP    Consent obatined:  Prior to procedure the appropriate consent was completed and verified  Timeout:Immediately prior to procedure a time out was called to verify the correct patient, procedure, equipment, support staff and site/side marked as required  Prep:Patient was prepped and draped in the usual sterile fashion  Assistants?: No      Colposcopy Site:  Cervix  Position:  Supine  Acrowhite Lesion? Yes    Atypical Vessels: No    Transformation Zone Adequate?: Yes    Biopsy?: Yes         Location:  Cervix ((3 00))  ECC Performed?: Yes    LEEP Performed?: No    Estimated blood loss (cc):  1   Patient tolerated the procedure well with no immediate complications.   Post-operative instructions were provided for the patient.   Patient was discharged and will follow up if any complications occur     CC: Presents for COLPOSCOPY:    Yuliya Flores is a 30 y.o. female who presents for a colposcopy secondary to abnormal pap smear.      UPT is negative.    INDICATIONS: Her most recent papsmear showed: ASCUS with POSITIVE high risk HPV    She does not have a history of abnormal pap smears.      PRE-COLPOSCOPY PROCEDURE COUNSELING:  Discussed the abnormal pap test findings, HPV, need for colposcopy and possible biopsies to determine a diagnosis and plan of care, treatments available, the minimal risks of bleeding and infection with a colposcopy, alternatives to colposcopy and she agrees to proceed.  Patient was given the opportunity to ask questions and verbal consent was obtained.        COLPOSCOPY EXAM:   TIME OUT PERFORMED.     acetowhite lesion(s) noted at 3 o'clock    Biopsy was taken at 3 o'clock.  ECC was performed.  Minimal blood loss.        The speculum was removed. The patient tolerated the procedure well.  There were no complications.      IMPRESSION:   Abnormal  Pap    POST COLPOSCOPY COUNSELING:   Manage post colposcopy cramping with NSAIDs, Tylenol or Rx per [facility].  Avoid anything in vagina (intercourse, douching, tampons) one week after the procedure.  Expect a clumpy blackish vaginal discharge (Monsel's solution) for several days.   Report bleeding heavier than a period, worsening pain, fever > 101.0 F, or foul-smelling vaginal discharge.  HPV vaccine recommended according to FDA age guidelines.  Importance of follow-up stressed.    Counseling lasted approximately 15 minutes and all her questions were answered.    FOLLOW-UP:   In 6 months.

## 2025-06-25 ENCOUNTER — RESULTS FOLLOW-UP (OUTPATIENT)
Dept: OBSTETRICS AND GYNECOLOGY | Facility: CLINIC | Age: 30
End: 2025-06-25

## 2025-06-25 LAB
ESTROGEN SERPL-MCNC: NORMAL PG/ML
INSULIN SERPL-ACNC: NORMAL U[IU]/ML
LAB AP CLINICAL INFORMATION: NORMAL
LAB AP GROSS DESCRIPTION: NORMAL
LAB AP PERFORMING LOCATION(S): NORMAL
LAB AP REPORT FOOTNOTES: NORMAL